# Patient Record
Sex: FEMALE | Race: WHITE | NOT HISPANIC OR LATINO | Employment: UNEMPLOYED | URBAN - METROPOLITAN AREA
[De-identification: names, ages, dates, MRNs, and addresses within clinical notes are randomized per-mention and may not be internally consistent; named-entity substitution may affect disease eponyms.]

---

## 2023-07-17 ENCOUNTER — LAB REQUISITION (OUTPATIENT)
Dept: LAB | Facility: CLINIC | Age: 23
End: 2023-07-17
Payer: COMMERCIAL

## 2023-07-17 DIAGNOSIS — Z13.1 ENCOUNTER FOR SCREENING FOR DIABETES MELLITUS: ICD-10-CM

## 2023-07-17 DIAGNOSIS — Z13.21 ENCOUNTER FOR SCREENING FOR NUTRITIONAL DISORDER: ICD-10-CM

## 2023-07-17 DIAGNOSIS — Z13.29 ENCOUNTER FOR SCREENING FOR OTHER SUSPECTED ENDOCRINE DISORDER: ICD-10-CM

## 2023-07-17 DIAGNOSIS — Z13.220 ENCOUNTER FOR SCREENING FOR LIPOID DISORDERS: ICD-10-CM

## 2023-07-17 LAB
ALBUMIN SERPL BCG-MCNC: 4.9 G/DL (ref 3.5–5.2)
ALP SERPL-CCNC: 74 U/L (ref 35–104)
ALT SERPL W P-5'-P-CCNC: 11 U/L (ref 0–50)
ANION GAP SERPL CALCULATED.3IONS-SCNC: 14 MMOL/L (ref 7–15)
AST SERPL W P-5'-P-CCNC: 16 U/L (ref 0–45)
BILIRUB SERPL-MCNC: 0.2 MG/DL
BUN SERPL-MCNC: 10.4 MG/DL (ref 6–20)
CALCIUM SERPL-MCNC: 9.4 MG/DL (ref 8.6–10)
CHLORIDE SERPL-SCNC: 102 MMOL/L (ref 98–107)
CHOLEST SERPL-MCNC: 196 MG/DL
CREAT SERPL-MCNC: 0.46 MG/DL (ref 0.51–0.95)
DEPRECATED HCO3 PLAS-SCNC: 22 MMOL/L (ref 22–29)
GFR SERPL CREATININE-BSD FRML MDRD: >90 ML/MIN/1.73M2
GLUCOSE SERPL-MCNC: 98 MG/DL (ref 70–99)
HDLC SERPL-MCNC: 68 MG/DL
LDLC SERPL CALC-MCNC: 113 MG/DL
NONHDLC SERPL-MCNC: 128 MG/DL
POTASSIUM SERPL-SCNC: 4.4 MMOL/L (ref 3.4–5.3)
PROT SERPL-MCNC: 7.6 G/DL (ref 6.4–8.3)
SODIUM SERPL-SCNC: 138 MMOL/L (ref 136–145)
TRIGL SERPL-MCNC: 73 MG/DL
TSH SERPL DL<=0.005 MIU/L-ACNC: 1.64 UIU/ML (ref 0.3–4.2)

## 2023-07-17 PROCEDURE — 84443 ASSAY THYROID STIM HORMONE: CPT | Mod: ORL | Performed by: FAMILY MEDICINE

## 2023-07-17 PROCEDURE — 82306 VITAMIN D 25 HYDROXY: CPT | Mod: ORL | Performed by: FAMILY MEDICINE

## 2023-07-17 PROCEDURE — 80053 COMPREHEN METABOLIC PANEL: CPT | Mod: ORL | Performed by: FAMILY MEDICINE

## 2023-07-17 PROCEDURE — 80061 LIPID PANEL: CPT | Mod: ORL | Performed by: FAMILY MEDICINE

## 2023-07-18 LAB — DEPRECATED CALCIDIOL+CALCIFEROL SERPL-MC: 31 UG/L (ref 20–75)

## 2023-07-27 ENCOUNTER — LAB REQUISITION (OUTPATIENT)
Dept: LAB | Facility: CLINIC | Age: 23
End: 2023-07-27
Payer: COMMERCIAL

## 2023-07-27 DIAGNOSIS — Z11.3 ENCOUNTER FOR SCREENING FOR INFECTIONS WITH A PREDOMINANTLY SEXUAL MODE OF TRANSMISSION: ICD-10-CM

## 2023-07-27 DIAGNOSIS — Z12.4 ENCOUNTER FOR SCREENING FOR MALIGNANT NEOPLASM OF CERVIX: ICD-10-CM

## 2023-07-27 PROCEDURE — G0145 SCR C/V CYTO,THINLAYER,RESCR: HCPCS | Mod: ORL | Performed by: PHYSICIAN ASSISTANT

## 2023-07-27 PROCEDURE — 87591 N.GONORRHOEAE DNA AMP PROB: CPT | Mod: ORL | Performed by: PHYSICIAN ASSISTANT

## 2023-07-27 PROCEDURE — 87491 CHLMYD TRACH DNA AMP PROBE: CPT | Mod: ORL | Performed by: PHYSICIAN ASSISTANT

## 2023-07-28 LAB
C TRACH DNA SPEC QL PROBE+SIG AMP: NEGATIVE
N GONORRHOEA DNA SPEC QL NAA+PROBE: NEGATIVE

## 2023-08-01 LAB
BKR LAB AP GYN ADEQUACY: NORMAL
BKR LAB AP GYN INTERPRETATION: NORMAL
BKR LAB AP HPV REFLEX: NORMAL
BKR LAB AP LMP: NORMAL
BKR LAB AP PREVIOUS ABNORMAL: NORMAL
PATH REPORT.COMMENTS IMP SPEC: NORMAL
PATH REPORT.COMMENTS IMP SPEC: NORMAL
PATH REPORT.RELEVANT HX SPEC: NORMAL

## 2024-06-17 ENCOUNTER — LAB REQUISITION (OUTPATIENT)
Dept: LAB | Facility: CLINIC | Age: 24
End: 2024-06-17
Payer: COMMERCIAL

## 2024-06-17 DIAGNOSIS — Z3A.08 8 WEEKS GESTATION OF PREGNANCY: ICD-10-CM

## 2024-06-17 PROCEDURE — 87086 URINE CULTURE/COLONY COUNT: CPT | Mod: ORL | Performed by: NURSE PRACTITIONER

## 2024-06-19 LAB — BACTERIA UR CULT: NORMAL

## 2024-07-17 ENCOUNTER — LAB REQUISITION (OUTPATIENT)
Dept: LAB | Facility: CLINIC | Age: 24
End: 2024-07-17
Payer: COMMERCIAL

## 2024-07-17 DIAGNOSIS — Z34.01 ENCOUNTER FOR SUPERVISION OF NORMAL FIRST PREGNANCY, FIRST TRIMESTER: ICD-10-CM

## 2024-07-17 LAB
BASOPHILS # BLD AUTO: 0.1 10E3/UL (ref 0–0.2)
BASOPHILS NFR BLD AUTO: 1 %
EOSINOPHIL # BLD AUTO: 0.1 10E3/UL (ref 0–0.7)
EOSINOPHIL NFR BLD AUTO: 1 %
ERYTHROCYTE [DISTWIDTH] IN BLOOD BY AUTOMATED COUNT: 13.5 % (ref 10–15)
HBA1C MFR BLD: 5.5 %
HCT VFR BLD AUTO: 40.9 % (ref 35–47)
HGB BLD-MCNC: 13.9 G/DL (ref 11.7–15.7)
IMM GRANULOCYTES # BLD: 0.1 10E3/UL
IMM GRANULOCYTES NFR BLD: 1 %
LYMPHOCYTES # BLD AUTO: 2.6 10E3/UL (ref 0.8–5.3)
LYMPHOCYTES NFR BLD AUTO: 28 %
MCH RBC QN AUTO: 28.9 PG (ref 26.5–33)
MCHC RBC AUTO-ENTMCNC: 34 G/DL (ref 31.5–36.5)
MCV RBC AUTO: 85 FL (ref 78–100)
MONOCYTES # BLD AUTO: 0.5 10E3/UL (ref 0–1.3)
MONOCYTES NFR BLD AUTO: 5 %
NEUTROPHILS # BLD AUTO: 6 10E3/UL (ref 1.6–8.3)
NEUTROPHILS NFR BLD AUTO: 64 %
NRBC # BLD AUTO: 0 10E3/UL
NRBC BLD AUTO-RTO: 0 /100
PLATELET # BLD AUTO: 265 10E3/UL (ref 150–450)
RBC # BLD AUTO: 4.81 10E6/UL (ref 3.8–5.2)
WBC # BLD AUTO: 9.3 10E3/UL (ref 4–11)

## 2024-07-17 PROCEDURE — 83036 HEMOGLOBIN GLYCOSYLATED A1C: CPT | Mod: ORL | Performed by: OBSTETRICS & GYNECOLOGY

## 2024-07-17 PROCEDURE — 86803 HEPATITIS C AB TEST: CPT | Mod: ORL | Performed by: OBSTETRICS & GYNECOLOGY

## 2024-07-17 PROCEDURE — 80081 OBSTETRIC PANEL INC HIV TSTG: CPT | Mod: ORL | Performed by: OBSTETRICS & GYNECOLOGY

## 2024-07-17 PROCEDURE — 87491 CHLMYD TRACH DNA AMP PROBE: CPT | Mod: ORL | Performed by: OBSTETRICS & GYNECOLOGY

## 2024-07-18 LAB
ABO/RH(D): NORMAL
ANTIBODY SCREEN: NEGATIVE
C TRACH DNA SPEC QL PROBE+SIG AMP: NEGATIVE
HBV SURFACE AG SERPL QL IA: NONREACTIVE
HCV AB SERPL QL IA: NONREACTIVE
HIV 1+2 AB+HIV1 P24 AG SERPL QL IA: NONREACTIVE
N GONORRHOEA DNA SPEC QL NAA+PROBE: NEGATIVE
RPR SER QL: NONREACTIVE
RUBV IGG SERPL QL IA: 2.69 INDEX
RUBV IGG SERPL QL IA: POSITIVE
SPECIMEN EXPIRATION DATE: NORMAL

## 2024-08-06 ENCOUNTER — LAB REQUISITION (OUTPATIENT)
Dept: LAB | Facility: CLINIC | Age: 24
End: 2024-08-06
Payer: COMMERCIAL

## 2024-08-06 DIAGNOSIS — Z3A.15 15 WEEKS GESTATION OF PREGNANCY: ICD-10-CM

## 2024-08-06 PROCEDURE — 82105 ALPHA-FETOPROTEIN SERUM: CPT | Mod: ORL | Performed by: NURSE PRACTITIONER

## 2024-08-10 LAB
# FETUSES US: NORMAL
AFP MOM SERPL: 1.43
AFP SERPL-MCNC: 56 NG/ML
AGE - REPORTED: 26.7 YR
CURRENT SMOKER: NO
FAMILY MEMBER DISEASES HX: NO
GA METHOD: NORMAL
GA: NORMAL WK
IDDM PATIENT QL: NO
INTEGRATED SCN PATIENT-IMP: NORMAL
SPECIMEN DRAWN SERPL: NORMAL

## 2024-10-23 ENCOUNTER — TRANSFERRED RECORDS (OUTPATIENT)
Dept: HEALTH INFORMATION MANAGEMENT | Facility: CLINIC | Age: 24
End: 2024-10-23

## 2024-10-23 ENCOUNTER — HOSPITAL ENCOUNTER (OUTPATIENT)
Facility: CLINIC | Age: 24
Discharge: HOME | End: 2024-10-23
Attending: OBSTETRICS & GYNECOLOGY | Admitting: OBSTETRICS & GYNECOLOGY
Payer: COMMERCIAL

## 2024-10-23 VITALS — DIASTOLIC BLOOD PRESSURE: 77 MMHG | SYSTOLIC BLOOD PRESSURE: 125 MMHG | TEMPERATURE: 98.1 F | RESPIRATION RATE: 16 BRPM

## 2024-10-23 PROBLEM — Z36.89 ENCOUNTER FOR TRIAGE IN PREGNANT PATIENT: Status: ACTIVE | Noted: 2024-10-23

## 2024-10-23 LAB
ALBUMIN UR-MCNC: NEGATIVE MG/DL
APPEARANCE UR: CLEAR
BACTERIA #/AREA URNS HPF: ABNORMAL /HPF
BILIRUB UR QL STRIP: NEGATIVE
CLUE CELLS: ABNORMAL
COLOR UR AUTO: ABNORMAL
GLUCOSE UR STRIP-MCNC: NEGATIVE MG/DL
HGB UR QL STRIP: NEGATIVE
KETONES UR STRIP-MCNC: NEGATIVE MG/DL
LEUKOCYTE ESTERASE UR QL STRIP: NEGATIVE
MUCOUS THREADS #/AREA URNS LPF: PRESENT /LPF
NITRATE UR QL: NEGATIVE
PH UR STRIP: 6.5 [PH] (ref 5–7)
RBC URINE: <1 /HPF
SP GR UR STRIP: 1.01 (ref 1–1.03)
SQUAMOUS EPITHELIAL: 9 /HPF
TRICHOMONAS, WET PREP: ABNORMAL
UROBILINOGEN UR STRIP-MCNC: NORMAL MG/DL
WBC URINE: 3 /HPF
WBC'S/HIGH POWER FIELD, WET PREP: ABNORMAL
YEAST, WET PREP: ABNORMAL

## 2024-10-23 PROCEDURE — G0463 HOSPITAL OUTPT CLINIC VISIT: HCPCS

## 2024-10-23 PROCEDURE — 87210 SMEAR WET MOUNT SALINE/INK: CPT

## 2024-10-23 PROCEDURE — 81001 URINALYSIS AUTO W/SCOPE: CPT

## 2024-10-23 PROCEDURE — 87491 CHLMYD TRACH DNA AMP PROBE: CPT

## 2024-10-23 PROCEDURE — 99214 OFFICE O/P EST MOD 30 MIN: CPT | Mod: 25 | Performed by: OBSTETRICS & GYNECOLOGY

## 2024-10-23 PROCEDURE — 87653 STREP B DNA AMP PROBE: CPT

## 2024-10-23 PROCEDURE — 59025 FETAL NON-STRESS TEST: CPT | Mod: 26 | Performed by: OBSTETRICS & GYNECOLOGY

## 2024-10-23 RX ORDER — ACETAMINOPHEN 325 MG/1
650 TABLET ORAL ONCE
Status: DISCONTINUED | OUTPATIENT
Start: 2024-10-23 | End: 2024-10-23 | Stop reason: HOSPADM

## 2024-10-23 RX ORDER — LIDOCAINE 40 MG/G
CREAM TOPICAL
Status: DISCONTINUED | OUTPATIENT
Start: 2024-10-23 | End: 2024-10-23 | Stop reason: HOSPADM

## 2024-10-23 RX ORDER — VITAMIN A, VITAMIN C, VITAMIN D-3, VITAMIN E, VITAMIN B-1, VITAMIN B-2, NIACIN, VITAMIN B-6, CALCIUM, IRON, ZINC, COPPER 4000; 120; 400; 22; 1.84; 3; 20; 10; 1; 12; 200; 27; 25; 2 [IU]/1; MG/1; [IU]/1; MG/1; MG/1; MG/1; MG/1; MG/1; MG/1; UG/1; MG/1; MG/1; MG/1; MG/1
1 TABLET ORAL DAILY
COMMUNITY

## 2024-10-23 ASSESSMENT — ACTIVITIES OF DAILY LIVING (ADL)
ADLS_ACUITY_SCORE: 0

## 2024-10-23 NOTE — PROGRESS NOTES
Data: Patient presented to [facility]: 10/23/2024  6:12 PM.  Reason for maternal/fetal assessment is abdominal pain. Patient reports intermittent abdominal cramping. Patient denies leaking of vaginal fluid/rupture of membranes, vaginal bleeding, pelvic pressure, nausea, vomiting, headache, visual disturbances, epigastric or RUQ pain, significant edema. Patient reports fetal movement is normal. Patient is a 27 weeks .  Prenatal record reviewed. Pregnancy has been uncomplicated.    Vital signs wnl. Support person is present.     Action: Verbal consent for EFM. Triage assessment completed.     Response: Patient verbalized agreement with plan. Will contact Dr. Pereyra with update and further orders.

## 2024-10-24 LAB
C TRACH DNA SPEC QL PROBE+SIG AMP: NEGATIVE
GP B STREP DNA SPEC QL NAA+PROBE: NEGATIVE
N GONORRHOEA DNA SPEC QL NAA+PROBE: NEGATIVE

## 2024-10-24 NOTE — PROGRESS NOTES
Data: Patient assessed in the [facility] for abdominal pain. Cervix  C/L/H Fetal station  . Membranes intact. Contractions are present. Contactions are  , x2 minutes apart, and last  seconds. Uterine assessment is mild by palpation during contractions and soft by palpation at rest. See flowsheets for fetal assessment documentation.     Action: Presumed adequate fetal oxygenation documented. Discharge instructions reviewed. Patient instructed to report change in fetal movement, vaginal leaking of fluid or bleeding, abdominal pain, or any concerns related to the pregnancy to provider/clinic.  Round ligament pain handout given to patient.     Response: Orders to discharge home per Dr. Ferguson. Patient verbalized understanding of education and agreement with plan. Discharged to home at 2153.

## 2024-10-24 NOTE — PROGRESS NOTES
[facility]  OB Triage Note    CC: Abdominal pain    HPI: Dana Lund is a 24 year old  at 27w0d , who presents with abdominal pain. Today she was doing dishes in her usual state of health when suddenly had crampy lower abdominal pain that radiates to her left upper thigh. She localizes pain to bilateral round ligament area. The pain continues to wax and wane. Pain is 6/10 at worst and increases while standing, moving around; improves with rest.    She has not had nausea/vomiting, diarrhea, or constipation. Denies headaches and vision changes. No upper abdominal or RUQ pain. No recent fevers/chills. No new or increased swelling. No dysuria.    She denies contractions, leaking fluid, vaginal bleeding. Good fetal movement. Pregnancy has been uncomplicated.    Obstetric Complications  None, regular prenatal care at [facility]?  Hx of spine surgery 2/2 scoliosis, scheduled for primary CS w/ GETA on 2025    O:  Patient Vitals for the past 24 hrs:   BP Temp Temp src Resp   10/23/24 1825 125/77 98.1  F (36.7  C) Oral 16     Gen: Well-appearing, NAD  CV: Warm well perfused   Pulm:  No increased work of breathing   Abd: Soft, gravid, non-tender to palpation; no palpable contractions   Ext: No LE edema b/l    Spec: Normal vaginal mucosa, physiologic discharge; long cervix, difficulty visualizing os   Cervix: C/L/H     FHT: , moderate fabiana, accels present, no decels   Vowinckel: no ctx in 10 mins    Labs:    Latest Reference Range & Units 10/23/24 20:35   Color Urine Colorless, Straw, Light Yellow, Yellow  Straw   Appearance Urine Clear  Clear   Glucose Urine Negative mg/dL Negative   Bilirubin Urine Negative  Negative   Ketones Urine Negative mg/dL Negative   Specific Gravity Urine 1.003 - 1.035  1.009   pH Urine 5.0 - 7.0  6.5   Protein Albumin Urine Negative mg/dL Negative   Urobilinogen mg/dL Normal, 2.0 mg/dL Normal   Nitrite Urine Negative  Negative   Blood Urine Negative  Negative   Leukocyte  Esterase Urine Negative  Negative   WBC Urine <=5 /HPF 3   RBC Urine <=2 /HPF <1   Bacteria Urine None Seen /HPF Few !   Squamous Epithelial /HPF Urine <=1 /HPF 9 (H)   Mucus Urine None Seen /LPF Present !   !: Data is abnormal  (H): Data is abnormally high     Latest Reference Range & Units 10/23/24 20:34   WBCs/high power field None  1+ !   Clue cells Absent  Absent   WET PREPARATION  Rpt !       A/P:  Dana Lund is a 24 year old  at 27w0d by patient report here with abdominal pain. Given location and characteristics of pain, most likely round ligament pain. Encouraged heat packs, tylenol as needed, and support with belly band. PTL ruled out w/ SSE and SVE. UA and wet prep wnl; GBS and CT/GC pending at time of discharge. FWB confirmed, NST reactive and reassuring with no activity on tocometer. Appropriate for discharge home.       #FWB: - Category I FHT, good fetal movement    Rose Orr, MS3  [employer] [facility] [employer] [employer]    I, Kaleigh Ferguson MD, was present with the medical student who participated in the service and in the documentation of the note. I have verified the history and personally performed the physical exam and medical decision making. I agree with the assessment and plan of care as documented in the note, and have addended it were appropriate.     Discussed with Dr. Pinon.     Kaleigh Ferguson MD  Obstetrics and Gynecology, PGY-2  10/23/24 9:53 PM    I personally examined and evaluated Dana Lund on 10/23/2024.  I discussed the patient with Dr. Ferguson and agree with the presentation, exam and plan of care documented in this note with edits by me.   Kimmie Pinon MD MPH

## 2024-10-29 ENCOUNTER — LAB REQUISITION (OUTPATIENT)
Dept: LAB | Facility: CLINIC | Age: 24
End: 2024-10-29
Payer: COMMERCIAL

## 2024-10-29 DIAGNOSIS — Z36.89 ENCOUNTER FOR OTHER SPECIFIED ANTENATAL SCREENING: ICD-10-CM

## 2024-10-29 DIAGNOSIS — Z11.3 ENCOUNTER FOR SCREENING FOR INFECTIONS WITH A PREDOMINANTLY SEXUAL MODE OF TRANSMISSION: ICD-10-CM

## 2024-10-29 LAB
ERYTHROCYTE [DISTWIDTH] IN BLOOD BY AUTOMATED COUNT: 13.4 % (ref 10–15)
HCT VFR BLD AUTO: 38.7 % (ref 35–47)
HGB BLD-MCNC: 12.2 G/DL (ref 11.7–15.7)
MCH RBC QN AUTO: 27.5 PG (ref 26.5–33)
MCHC RBC AUTO-ENTMCNC: 31.5 G/DL (ref 31.5–36.5)
MCV RBC AUTO: 87 FL (ref 78–100)
PLATELET # BLD AUTO: 275 10E3/UL (ref 150–450)
RBC # BLD AUTO: 4.44 10E6/UL (ref 3.8–5.2)
WBC # BLD AUTO: 12.2 10E3/UL (ref 4–11)

## 2024-10-29 PROCEDURE — 86780 TREPONEMA PALLIDUM: CPT | Mod: ORL | Performed by: NURSE PRACTITIONER

## 2024-10-29 PROCEDURE — 85027 COMPLETE CBC AUTOMATED: CPT | Mod: ORL | Performed by: NURSE PRACTITIONER

## 2024-10-30 LAB — T PALLIDUM AB SER QL: NONREACTIVE

## 2024-12-17 ENCOUNTER — HOSPITAL ENCOUNTER (OUTPATIENT)
Dept: CARDIOLOGY | Facility: CLINIC | Age: 24
Discharge: HOME | End: 2024-12-17
Attending: OBSTETRICS & GYNECOLOGY
Payer: COMMERCIAL

## 2024-12-17 DIAGNOSIS — R00.2 PALPITATIONS: ICD-10-CM

## 2024-12-17 PROCEDURE — 93225 XTRNL ECG REC<48 HRS REC: CPT

## 2025-01-03 ENCOUNTER — LAB REQUISITION (OUTPATIENT)
Dept: LAB | Facility: CLINIC | Age: 25
End: 2025-01-03
Payer: COMMERCIAL

## 2025-01-03 DIAGNOSIS — Z36.85 ENCOUNTER FOR ANTENATAL SCREENING FOR STREPTOCOCCUS B: ICD-10-CM

## 2025-01-03 PROCEDURE — 87653 STREP B DNA AMP PROBE: CPT | Mod: ORL | Performed by: OBSTETRICS & GYNECOLOGY

## 2025-01-04 LAB — GP B STREP DNA SPEC QL NAA+PROBE: NEGATIVE

## 2025-01-14 ENCOUNTER — ANESTHESIA EVENT (OUTPATIENT)
Dept: OBGYN | Facility: HOSPITAL | Age: 25
End: 2025-01-14
Payer: COMMERCIAL

## 2025-01-15 ENCOUNTER — HOSPITAL ENCOUNTER (INPATIENT)
Facility: HOSPITAL | Age: 25
End: 2025-01-15
Attending: OBSTETRICS & GYNECOLOGY | Admitting: OBSTETRICS & GYNECOLOGY
Payer: COMMERCIAL

## 2025-01-15 ENCOUNTER — ANESTHESIA (OUTPATIENT)
Dept: OBGYN | Facility: HOSPITAL | Age: 25
End: 2025-01-15
Payer: COMMERCIAL

## 2025-01-15 DIAGNOSIS — O33.0 CEPHALOPELVIC DISPROPORTION DUE TO DEFORMITY OF MATERNAL PELVIC BONES: Primary | ICD-10-CM

## 2025-01-15 PROBLEM — Z34.90 PREGNANCY: Status: ACTIVE | Noted: 2025-01-15

## 2025-01-15 LAB
ABO + RH BLD: NORMAL
APTT PPP: 26 SECONDS (ref 22–38)
BLD GP AB SCN SERPL QL: NEGATIVE
GLUCOSE BLDC GLUCOMTR-MCNC: 129 MG/DL (ref 70–99)
HGB BLD-MCNC: 11.5 G/DL (ref 11.7–15.7)
HGB BLD-MCNC: 9.7 G/DL (ref 11.7–15.7)
INR PPP: 0.99 (ref 0.85–1.15)
PLATELET # BLD AUTO: 217 10E3/UL (ref 150–450)
SPECIMEN EXP DATE BLD: NORMAL
T PALLIDUM AB SER QL: NONREACTIVE

## 2025-01-15 PROCEDURE — 250N000011 HC RX IP 250 OP 636: Performed by: NURSE ANESTHETIST, CERTIFIED REGISTERED

## 2025-01-15 PROCEDURE — 85049 AUTOMATED PLATELET COUNT: CPT | Performed by: OBSTETRICS & GYNECOLOGY

## 2025-01-15 PROCEDURE — 250N000011 HC RX IP 250 OP 636: Performed by: PHYSICIAN ASSISTANT

## 2025-01-15 PROCEDURE — 36415 COLL VENOUS BLD VENIPUNCTURE: CPT | Performed by: OBSTETRICS & GYNECOLOGY

## 2025-01-15 PROCEDURE — 272N000001 HC OR GENERAL SUPPLY STERILE: Performed by: OBSTETRICS & GYNECOLOGY

## 2025-01-15 PROCEDURE — 258N000003 HC RX IP 258 OP 636: Performed by: NURSE ANESTHETIST, CERTIFIED REGISTERED

## 2025-01-15 PROCEDURE — 85730 THROMBOPLASTIN TIME PARTIAL: CPT | Performed by: OBSTETRICS & GYNECOLOGY

## 2025-01-15 PROCEDURE — 999N000249 HC STATISTIC C-SECTION ON UNIT: Performed by: OBSTETRICS & GYNECOLOGY

## 2025-01-15 PROCEDURE — 370N000017 HC ANESTHESIA TECHNICAL FEE, PER MIN: Performed by: OBSTETRICS & GYNECOLOGY

## 2025-01-15 PROCEDURE — 120N000001 HC R&B MED SURG/OB

## 2025-01-15 PROCEDURE — 85018 HEMOGLOBIN: CPT | Performed by: OBSTETRICS & GYNECOLOGY

## 2025-01-15 PROCEDURE — 85018 HEMOGLOBIN: CPT | Performed by: PHYSICIAN ASSISTANT

## 2025-01-15 PROCEDURE — 86780 TREPONEMA PALLIDUM: CPT | Performed by: PHYSICIAN ASSISTANT

## 2025-01-15 PROCEDURE — 999N000016 HC STATISTIC ATTENDANCE AT DELIVERY

## 2025-01-15 PROCEDURE — 258N000003 HC RX IP 258 OP 636: Performed by: OBSTETRICS & GYNECOLOGY

## 2025-01-15 PROCEDURE — 360N000076 HC SURGERY LEVEL 3, PER MIN: Performed by: OBSTETRICS & GYNECOLOGY

## 2025-01-15 PROCEDURE — 250N000013 HC RX MED GY IP 250 OP 250 PS 637: Performed by: OBSTETRICS & GYNECOLOGY

## 2025-01-15 PROCEDURE — 999N000249 HC STATISTIC C-SECTION ON UNIT

## 2025-01-15 PROCEDURE — 86850 RBC ANTIBODY SCREEN: CPT | Performed by: PHYSICIAN ASSISTANT

## 2025-01-15 PROCEDURE — 36415 COLL VENOUS BLD VENIPUNCTURE: CPT | Performed by: PHYSICIAN ASSISTANT

## 2025-01-15 PROCEDURE — 250N000025 HC SEVOFLURANE, PER MIN: Performed by: OBSTETRICS & GYNECOLOGY

## 2025-01-15 PROCEDURE — 250N000011 HC RX IP 250 OP 636: Performed by: GENERAL ACUTE CARE HOSPITAL

## 2025-01-15 PROCEDURE — 258N000003 HC RX IP 258 OP 636: Performed by: PHYSICIAN ASSISTANT

## 2025-01-15 PROCEDURE — 86900 BLOOD TYPING SEROLOGIC ABO: CPT | Performed by: PHYSICIAN ASSISTANT

## 2025-01-15 PROCEDURE — 250N000013 HC RX MED GY IP 250 OP 250 PS 637: Performed by: PHYSICIAN ASSISTANT

## 2025-01-15 PROCEDURE — 710N000009 HC RECOVERY PHASE 1, LEVEL 1, PER MIN: Performed by: OBSTETRICS & GYNECOLOGY

## 2025-01-15 PROCEDURE — 85610 PROTHROMBIN TIME: CPT | Performed by: OBSTETRICS & GYNECOLOGY

## 2025-01-15 PROCEDURE — 250N000009 HC RX 250: Performed by: PHYSICIAN ASSISTANT

## 2025-01-15 PROCEDURE — 250N000009 HC RX 250: Performed by: NURSE ANESTHETIST, CERTIFIED REGISTERED

## 2025-01-15 RX ORDER — CITRIC ACID/SODIUM CITRATE 334-500MG
30 SOLUTION, ORAL ORAL
Status: COMPLETED | OUTPATIENT
Start: 2025-01-15 | End: 2025-01-15

## 2025-01-15 RX ORDER — SODIUM PHOSPHATE,MONO-DIBASIC 19G-7G/118
1 ENEMA (ML) RECTAL DAILY PRN
Status: DISCONTINUED | OUTPATIENT
Start: 2025-01-17 | End: 2025-01-18 | Stop reason: HOSPADM

## 2025-01-15 RX ORDER — LOPERAMIDE HYDROCHLORIDE 2 MG/1
2 CAPSULE ORAL
Status: DISCONTINUED | OUTPATIENT
Start: 2025-01-15 | End: 2025-01-18 | Stop reason: HOSPADM

## 2025-01-15 RX ORDER — ONDANSETRON 2 MG/ML
4 INJECTION INTRAMUSCULAR; INTRAVENOUS EVERY 30 MIN PRN
Status: DISCONTINUED | OUTPATIENT
Start: 2025-01-15 | End: 2025-01-15 | Stop reason: HOSPADM

## 2025-01-15 RX ORDER — ONDANSETRON 2 MG/ML
4 INJECTION INTRAMUSCULAR; INTRAVENOUS EVERY 6 HOURS PRN
Status: DISCONTINUED | OUTPATIENT
Start: 2025-01-15 | End: 2025-01-18 | Stop reason: HOSPADM

## 2025-01-15 RX ORDER — SODIUM CHLORIDE, SODIUM LACTATE, POTASSIUM CHLORIDE, CALCIUM CHLORIDE 600; 310; 30; 20 MG/100ML; MG/100ML; MG/100ML; MG/100ML
INJECTION, SOLUTION INTRAVENOUS CONTINUOUS
Status: DISCONTINUED | OUTPATIENT
Start: 2025-01-15 | End: 2025-01-15 | Stop reason: HOSPADM

## 2025-01-15 RX ORDER — LOPERAMIDE HYDROCHLORIDE 2 MG/1
4 CAPSULE ORAL
Status: DISCONTINUED | OUTPATIENT
Start: 2025-01-15 | End: 2025-01-18 | Stop reason: HOSPADM

## 2025-01-15 RX ORDER — DEXAMETHASONE SODIUM PHOSPHATE 4 MG/ML
4 INJECTION, SOLUTION INTRA-ARTICULAR; INTRALESIONAL; INTRAMUSCULAR; INTRAVENOUS; SOFT TISSUE
Status: DISCONTINUED | OUTPATIENT
Start: 2025-01-15 | End: 2025-01-15 | Stop reason: HOSPADM

## 2025-01-15 RX ORDER — DEXAMETHASONE SODIUM PHOSPHATE 10 MG/ML
INJECTION, SOLUTION INTRAMUSCULAR; INTRAVENOUS PRN
Status: DISCONTINUED | OUTPATIENT
Start: 2025-01-15 | End: 2025-01-15

## 2025-01-15 RX ORDER — LIDOCAINE 40 MG/G
CREAM TOPICAL
Status: DISCONTINUED | OUTPATIENT
Start: 2025-01-15 | End: 2025-01-15 | Stop reason: HOSPADM

## 2025-01-15 RX ORDER — KETOROLAC TROMETHAMINE 30 MG/ML
INJECTION, SOLUTION INTRAMUSCULAR; INTRAVENOUS PRN
Status: DISCONTINUED | OUTPATIENT
Start: 2025-01-15 | End: 2025-01-15

## 2025-01-15 RX ORDER — SIMETHICONE 80 MG
80 TABLET,CHEWABLE ORAL 4 TIMES DAILY PRN
Status: DISCONTINUED | OUTPATIENT
Start: 2025-01-15 | End: 2025-01-18 | Stop reason: HOSPADM

## 2025-01-15 RX ORDER — LOPERAMIDE HYDROCHLORIDE 2 MG/1
2 CAPSULE ORAL
Status: DISCONTINUED | OUTPATIENT
Start: 2025-01-15 | End: 2025-01-15 | Stop reason: HOSPADM

## 2025-01-15 RX ORDER — BISACODYL 10 MG
10 SUPPOSITORY, RECTAL RECTAL DAILY PRN
Status: DISCONTINUED | OUTPATIENT
Start: 2025-01-17 | End: 2025-01-18 | Stop reason: HOSPADM

## 2025-01-15 RX ORDER — METOCLOPRAMIDE HYDROCHLORIDE 5 MG/ML
10 INJECTION INTRAMUSCULAR; INTRAVENOUS EVERY 6 HOURS PRN
Status: DISCONTINUED | OUTPATIENT
Start: 2025-01-15 | End: 2025-01-18 | Stop reason: HOSPADM

## 2025-01-15 RX ORDER — HYDROMORPHONE HCL IN WATER/PF 6 MG/30 ML
0.4 PATIENT CONTROLLED ANALGESIA SYRINGE INTRAVENOUS EVERY 5 MIN PRN
Status: DISCONTINUED | OUTPATIENT
Start: 2025-01-15 | End: 2025-01-15 | Stop reason: HOSPADM

## 2025-01-15 RX ORDER — MISOPROSTOL 200 UG/1
800 TABLET ORAL
Status: DISCONTINUED | OUTPATIENT
Start: 2025-01-15 | End: 2025-01-15 | Stop reason: HOSPADM

## 2025-01-15 RX ORDER — ONDANSETRON 4 MG/1
4 TABLET, ORALLY DISINTEGRATING ORAL EVERY 6 HOURS PRN
Status: DISCONTINUED | OUTPATIENT
Start: 2025-01-15 | End: 2025-01-18 | Stop reason: HOSPADM

## 2025-01-15 RX ORDER — ACETAMINOPHEN 325 MG/1
975 TABLET ORAL ONCE
Status: COMPLETED | OUTPATIENT
Start: 2025-01-15 | End: 2025-01-15

## 2025-01-15 RX ORDER — IBUPROFEN 800 MG/1
800 TABLET, FILM COATED ORAL EVERY 6 HOURS
Status: DISCONTINUED | OUTPATIENT
Start: 2025-01-15 | End: 2025-01-18 | Stop reason: HOSPADM

## 2025-01-15 RX ORDER — FENTANYL CITRATE 50 UG/ML
50 INJECTION, SOLUTION INTRAMUSCULAR; INTRAVENOUS EVERY 5 MIN PRN
Status: DISCONTINUED | OUTPATIENT
Start: 2025-01-15 | End: 2025-01-15 | Stop reason: HOSPADM

## 2025-01-15 RX ORDER — FENTANYL CITRATE 50 UG/ML
25 INJECTION, SOLUTION INTRAMUSCULAR; INTRAVENOUS EVERY 5 MIN PRN
Status: DISCONTINUED | OUTPATIENT
Start: 2025-01-15 | End: 2025-01-15 | Stop reason: HOSPADM

## 2025-01-15 RX ORDER — AMOXICILLIN 250 MG
1 CAPSULE ORAL 2 TIMES DAILY
Status: DISCONTINUED | OUTPATIENT
Start: 2025-01-15 | End: 2025-01-18 | Stop reason: HOSPADM

## 2025-01-15 RX ORDER — CEFAZOLIN SODIUM/WATER 2 G/20 ML
2 SYRINGE (ML) INTRAVENOUS SEE ADMIN INSTRUCTIONS
Status: DISCONTINUED | OUTPATIENT
Start: 2025-01-15 | End: 2025-01-15 | Stop reason: HOSPADM

## 2025-01-15 RX ORDER — METHYLERGONOVINE MALEATE 0.2 MG/ML
200 INJECTION INTRAVENOUS
Status: DISCONTINUED | OUTPATIENT
Start: 2025-01-15 | End: 2025-01-18 | Stop reason: HOSPADM

## 2025-01-15 RX ORDER — AMOXICILLIN 250 MG
2 CAPSULE ORAL 2 TIMES DAILY
Status: DISCONTINUED | OUTPATIENT
Start: 2025-01-15 | End: 2025-01-18 | Stop reason: HOSPADM

## 2025-01-15 RX ORDER — PROPOFOL 10 MG/ML
INJECTION, EMULSION INTRAVENOUS PRN
Status: DISCONTINUED | OUTPATIENT
Start: 2025-01-15 | End: 2025-01-15

## 2025-01-15 RX ORDER — NALOXONE HYDROCHLORIDE 0.4 MG/ML
0.2 INJECTION, SOLUTION INTRAMUSCULAR; INTRAVENOUS; SUBCUTANEOUS
Status: DISCONTINUED | OUTPATIENT
Start: 2025-01-15 | End: 2025-01-18 | Stop reason: HOSPADM

## 2025-01-15 RX ORDER — MODIFIED LANOLIN
OINTMENT (GRAM) TOPICAL
Status: DISCONTINUED | OUTPATIENT
Start: 2025-01-15 | End: 2025-01-18 | Stop reason: HOSPADM

## 2025-01-15 RX ORDER — ACETAMINOPHEN 325 MG/1
975 TABLET ORAL EVERY 6 HOURS
Status: DISCONTINUED | OUTPATIENT
Start: 2025-01-15 | End: 2025-01-18 | Stop reason: HOSPADM

## 2025-01-15 RX ORDER — OXYTOCIN/0.9 % SODIUM CHLORIDE 30/500 ML
340 PLASTIC BAG, INJECTION (ML) INTRAVENOUS CONTINUOUS PRN
Status: DISCONTINUED | OUTPATIENT
Start: 2025-01-15 | End: 2025-01-18 | Stop reason: HOSPADM

## 2025-01-15 RX ORDER — DEXTROSE, SODIUM CHLORIDE, SODIUM LACTATE, POTASSIUM CHLORIDE, AND CALCIUM CHLORIDE 5; .6; .31; .03; .02 G/100ML; G/100ML; G/100ML; G/100ML; G/100ML
INJECTION, SOLUTION INTRAVENOUS CONTINUOUS
Status: DISCONTINUED | OUTPATIENT
Start: 2025-01-15 | End: 2025-01-18 | Stop reason: HOSPADM

## 2025-01-15 RX ORDER — OXYTOCIN 10 [USP'U]/ML
10 INJECTION, SOLUTION INTRAMUSCULAR; INTRAVENOUS
Status: DISCONTINUED | OUTPATIENT
Start: 2025-01-15 | End: 2025-01-15 | Stop reason: HOSPADM

## 2025-01-15 RX ORDER — NALOXONE HYDROCHLORIDE 0.4 MG/ML
0.4 INJECTION, SOLUTION INTRAMUSCULAR; INTRAVENOUS; SUBCUTANEOUS
Status: DISCONTINUED | OUTPATIENT
Start: 2025-01-15 | End: 2025-01-18 | Stop reason: HOSPADM

## 2025-01-15 RX ORDER — NALOXONE HYDROCHLORIDE 0.4 MG/ML
0.1 INJECTION, SOLUTION INTRAMUSCULAR; INTRAVENOUS; SUBCUTANEOUS
Status: DISCONTINUED | OUTPATIENT
Start: 2025-01-15 | End: 2025-01-15 | Stop reason: HOSPADM

## 2025-01-15 RX ORDER — OXYCODONE HYDROCHLORIDE 5 MG/1
5 TABLET ORAL EVERY 4 HOURS PRN
Status: DISCONTINUED | OUTPATIENT
Start: 2025-01-15 | End: 2025-01-18 | Stop reason: HOSPADM

## 2025-01-15 RX ORDER — BUPIVACAINE HYDROCHLORIDE 2.5 MG/ML
INJECTION, SOLUTION EPIDURAL; INFILTRATION; INTRACAUDAL
Status: COMPLETED | OUTPATIENT
Start: 2025-01-15 | End: 2025-01-15

## 2025-01-15 RX ORDER — CARBOPROST TROMETHAMINE 250 UG/ML
250 INJECTION, SOLUTION INTRAMUSCULAR
Status: DISCONTINUED | OUTPATIENT
Start: 2025-01-15 | End: 2025-01-15 | Stop reason: HOSPADM

## 2025-01-15 RX ORDER — METHYLERGONOVINE MALEATE 0.2 MG/ML
200 INJECTION INTRAVENOUS
Status: DISCONTINUED | OUTPATIENT
Start: 2025-01-15 | End: 2025-01-15 | Stop reason: HOSPADM

## 2025-01-15 RX ORDER — ONDANSETRON 2 MG/ML
INJECTION INTRAMUSCULAR; INTRAVENOUS PRN
Status: DISCONTINUED | OUTPATIENT
Start: 2025-01-15 | End: 2025-01-15

## 2025-01-15 RX ORDER — TRANEXAMIC ACID 10 MG/ML
1 INJECTION, SOLUTION INTRAVENOUS EVERY 30 MIN PRN
Status: DISCONTINUED | OUTPATIENT
Start: 2025-01-15 | End: 2025-01-15 | Stop reason: HOSPADM

## 2025-01-15 RX ORDER — OXYTOCIN/0.9 % SODIUM CHLORIDE 30/500 ML
340 PLASTIC BAG, INJECTION (ML) INTRAVENOUS CONTINUOUS PRN
Status: DISCONTINUED | OUTPATIENT
Start: 2025-01-15 | End: 2025-01-15 | Stop reason: HOSPADM

## 2025-01-15 RX ORDER — LOPERAMIDE HYDROCHLORIDE 2 MG/1
4 CAPSULE ORAL
Status: DISCONTINUED | OUTPATIENT
Start: 2025-01-15 | End: 2025-01-15 | Stop reason: HOSPADM

## 2025-01-15 RX ORDER — CEFAZOLIN SODIUM/WATER 2 G/20 ML
2 SYRINGE (ML) INTRAVENOUS
Status: COMPLETED | OUTPATIENT
Start: 2025-01-15 | End: 2025-01-15

## 2025-01-15 RX ORDER — METOCLOPRAMIDE 10 MG/1
10 TABLET ORAL EVERY 6 HOURS PRN
Status: DISCONTINUED | OUTPATIENT
Start: 2025-01-15 | End: 2025-01-18 | Stop reason: HOSPADM

## 2025-01-15 RX ORDER — ONDANSETRON 4 MG/1
4 TABLET, ORALLY DISINTEGRATING ORAL EVERY 30 MIN PRN
Status: DISCONTINUED | OUTPATIENT
Start: 2025-01-15 | End: 2025-01-15 | Stop reason: HOSPADM

## 2025-01-15 RX ORDER — OXYTOCIN 10 [USP'U]/ML
10 INJECTION, SOLUTION INTRAMUSCULAR; INTRAVENOUS
Status: DISCONTINUED | OUTPATIENT
Start: 2025-01-15 | End: 2025-01-18 | Stop reason: HOSPADM

## 2025-01-15 RX ORDER — MISOPROSTOL 200 UG/1
400 TABLET ORAL
Status: DISCONTINUED | OUTPATIENT
Start: 2025-01-15 | End: 2025-01-18 | Stop reason: HOSPADM

## 2025-01-15 RX ORDER — MISOPROSTOL 200 UG/1
400 TABLET ORAL
Status: DISCONTINUED | OUTPATIENT
Start: 2025-01-15 | End: 2025-01-15 | Stop reason: HOSPADM

## 2025-01-15 RX ORDER — PROCHLORPERAZINE MALEATE 10 MG
10 TABLET ORAL EVERY 6 HOURS PRN
Status: DISCONTINUED | OUTPATIENT
Start: 2025-01-15 | End: 2025-01-18 | Stop reason: HOSPADM

## 2025-01-15 RX ORDER — TRANEXAMIC ACID 10 MG/ML
1 INJECTION, SOLUTION INTRAVENOUS EVERY 30 MIN PRN
Status: DISCONTINUED | OUTPATIENT
Start: 2025-01-15 | End: 2025-01-18 | Stop reason: HOSPADM

## 2025-01-15 RX ORDER — MISOPROSTOL 200 UG/1
800 TABLET ORAL
Status: DISCONTINUED | OUTPATIENT
Start: 2025-01-15 | End: 2025-01-18 | Stop reason: HOSPADM

## 2025-01-15 RX ORDER — OXYTOCIN/0.9 % SODIUM CHLORIDE 30/500 ML
100-340 PLASTIC BAG, INJECTION (ML) INTRAVENOUS CONTINUOUS PRN
Status: DISCONTINUED | OUTPATIENT
Start: 2025-01-15 | End: 2025-01-18 | Stop reason: HOSPADM

## 2025-01-15 RX ORDER — HYDROMORPHONE HCL IN WATER/PF 6 MG/30 ML
0.2 PATIENT CONTROLLED ANALGESIA SYRINGE INTRAVENOUS EVERY 5 MIN PRN
Status: DISCONTINUED | OUTPATIENT
Start: 2025-01-15 | End: 2025-01-15 | Stop reason: HOSPADM

## 2025-01-15 RX ORDER — HYDROCORTISONE 25 MG/G
CREAM TOPICAL 3 TIMES DAILY PRN
Status: DISCONTINUED | OUTPATIENT
Start: 2025-01-15 | End: 2025-01-18 | Stop reason: HOSPADM

## 2025-01-15 RX ORDER — CARBOPROST TROMETHAMINE 250 UG/ML
250 INJECTION, SOLUTION INTRAMUSCULAR
Status: DISCONTINUED | OUTPATIENT
Start: 2025-01-15 | End: 2025-01-18 | Stop reason: HOSPADM

## 2025-01-15 RX ORDER — IBUPROFEN 800 MG/1
800 TABLET, FILM COATED ORAL EVERY 6 HOURS
Status: DISCONTINUED | OUTPATIENT
Start: 2025-01-16 | End: 2025-01-15

## 2025-01-15 RX ORDER — LIDOCAINE 40 MG/G
CREAM TOPICAL
Status: DISCONTINUED | OUTPATIENT
Start: 2025-01-15 | End: 2025-01-18 | Stop reason: HOSPADM

## 2025-01-15 RX ORDER — SODIUM CHLORIDE, SODIUM LACTATE, POTASSIUM CHLORIDE, CALCIUM CHLORIDE 600; 310; 30; 20 MG/100ML; MG/100ML; MG/100ML; MG/100ML
INJECTION, SOLUTION INTRAVENOUS CONTINUOUS
Status: DISCONTINUED | OUTPATIENT
Start: 2025-01-15 | End: 2025-01-18 | Stop reason: HOSPADM

## 2025-01-15 RX ORDER — KETOROLAC TROMETHAMINE 30 MG/ML
30 INJECTION, SOLUTION INTRAMUSCULAR; INTRAVENOUS EVERY 6 HOURS
Status: DISCONTINUED | OUTPATIENT
Start: 2025-01-15 | End: 2025-01-15

## 2025-01-15 RX ADMIN — SODIUM CHLORIDE, POTASSIUM CHLORIDE, SODIUM LACTATE AND CALCIUM CHLORIDE: 600; 310; 30; 20 INJECTION, SOLUTION INTRAVENOUS at 14:07

## 2025-01-15 RX ADMIN — KETOROLAC TROMETHAMINE 15 MG: 30 INJECTION, SOLUTION INTRAMUSCULAR at 10:09

## 2025-01-15 RX ADMIN — SODIUM CHLORIDE, POTASSIUM CHLORIDE, SODIUM LACTATE AND CALCIUM CHLORIDE 500 ML: 600; 310; 30; 20 INJECTION, SOLUTION INTRAVENOUS at 06:02

## 2025-01-15 RX ADMIN — SODIUM CHLORIDE, POTASSIUM CHLORIDE, SODIUM LACTATE AND CALCIUM CHLORIDE: 600; 310; 30; 20 INJECTION, SOLUTION INTRAVENOUS at 09:23

## 2025-01-15 RX ADMIN — SENNOSIDES AND DOCUSATE SODIUM 1 TABLET: 50; 8.6 TABLET ORAL at 22:18

## 2025-01-15 RX ADMIN — BUPIVACAINE 10 ML: 13.3 INJECTION, SUSPENSION, LIPOSOMAL INFILTRATION at 10:22

## 2025-01-15 RX ADMIN — Medication 2 G: at 09:31

## 2025-01-15 RX ADMIN — SIMETHICONE 80 MG: 80 TABLET, CHEWABLE ORAL at 19:50

## 2025-01-15 RX ADMIN — TRANEXAMIC ACID 1 G: 1 INJECTION, SOLUTION INTRAVENOUS at 09:32

## 2025-01-15 RX ADMIN — SENNOSIDES AND DOCUSATE SODIUM 2 TABLET: 50; 8.6 TABLET ORAL at 12:40

## 2025-01-15 RX ADMIN — ACETAMINOPHEN 975 MG: 325 TABLET ORAL at 06:16

## 2025-01-15 RX ADMIN — SODIUM CITRATE AND CITRIC ACID MONOHYDRATE 30 ML: 500; 334 SOLUTION ORAL at 06:15

## 2025-01-15 RX ADMIN — PROPOFOL 200 MG: 10 INJECTION, EMULSION INTRAVENOUS at 09:41

## 2025-01-15 RX ADMIN — BUPIVACAINE HYDROCHLORIDE 20 ML: 2.5 INJECTION, SOLUTION EPIDURAL; INFILTRATION; INTRACAUDAL at 10:22

## 2025-01-15 RX ADMIN — ACETAMINOPHEN 975 MG: 325 TABLET ORAL at 12:40

## 2025-01-15 RX ADMIN — ONDANSETRON 4 MG: 2 INJECTION INTRAMUSCULAR; INTRAVENOUS at 09:53

## 2025-01-15 RX ADMIN — DEXAMETHASONE SODIUM PHOSPHATE 10 MG: 10 INJECTION, SOLUTION INTRAMUSCULAR; INTRAVENOUS at 09:53

## 2025-01-15 RX ADMIN — METHYLERGONOVINE MALEATE 200 MCG: 0.2 INJECTION INTRAVENOUS at 09:48

## 2025-01-15 RX ADMIN — ACETAMINOPHEN 975 MG: 325 TABLET ORAL at 19:50

## 2025-01-15 RX ADMIN — LIDOCAINE HYDROCHLORIDE 50 MG: 10 INJECTION, SOLUTION EPIDURAL; INFILTRATION; INTRACAUDAL; PERINEURAL at 09:41

## 2025-01-15 RX ADMIN — IBUPROFEN 800 MG: 800 TABLET ORAL at 16:35

## 2025-01-15 RX ADMIN — BUPIVACAINE HYDROCHLORIDE 20 ML: 2.5 INJECTION, SOLUTION EPIDURAL; INFILTRATION; INTRACAUDAL at 10:27

## 2025-01-15 RX ADMIN — Medication 340 ML/HR: at 09:46

## 2025-01-15 RX ADMIN — BUPIVACAINE 10 ML: 13.3 INJECTION, SUSPENSION, LIPOSOMAL INFILTRATION at 10:27

## 2025-01-15 RX ADMIN — FENTANYL CITRATE 25 MCG: 50 INJECTION, SOLUTION INTRAMUSCULAR; INTRAVENOUS at 10:46

## 2025-01-15 RX ADMIN — HYDROMORPHONE HYDROCHLORIDE 1 MG: 1 INJECTION, SOLUTION INTRAMUSCULAR; INTRAVENOUS; SUBCUTANEOUS at 10:03

## 2025-01-15 RX ADMIN — IBUPROFEN 800 MG: 800 TABLET ORAL at 22:18

## 2025-01-15 RX ADMIN — Medication 80 MG: at 09:46

## 2025-01-15 ASSESSMENT — ACTIVITIES OF DAILY LIVING (ADL)
ADLS_ACUITY_SCORE: 28
ADLS_ACUITY_SCORE: 34
ADLS_ACUITY_SCORE: 28
ADLS_ACUITY_SCORE: 18
ADLS_ACUITY_SCORE: 28
ADLS_ACUITY_SCORE: 21
ADLS_ACUITY_SCORE: 28
ADLS_ACUITY_SCORE: 34
ADLS_ACUITY_SCORE: 28
ADLS_ACUITY_SCORE: 27
ADLS_ACUITY_SCORE: 18
ADLS_ACUITY_SCORE: 18
ADLS_ACUITY_SCORE: 28
ADLS_ACUITY_SCORE: 21
ADLS_ACUITY_SCORE: 18
ADLS_ACUITY_SCORE: 18
ADLS_ACUITY_SCORE: 21
ADLS_ACUITY_SCORE: 27

## 2025-01-15 NOTE — PLAN OF CARE
Notified of QBL 1225 mL.  Per RN fundus is firm, pulse is normal, appropriate UOP thus far postop.  Stage 2 PPH order set initiated.    Ada Bailey MD FACOG  [facility]  [phone]

## 2025-01-15 NOTE — PLAN OF CARE
Goal Outcome Evaluation:      Plan of Care Reviewed With: patient    Overall Patient Progress: improvingOverall Patient Progress: improving       Problem: Postpartum ( Delivery)  Goal: Optimal Pain Control and Function  Outcome: Progressing  Intervention: Prevent or Manage Pain  Recent Flowsheet Documentation  Taken 1/15/2025 9665 by Dayana Feliz RN  Pain Management Interventions: medication (see MAR)  Pt is taking scheduled pain medications in order to stay on top of pain management. Pt is rating pain a 1 on the 0-10 pain scale.

## 2025-01-15 NOTE — OP NOTE
[facility] LABOR & DELIVERY    SECTION - OPERATIVE NOTE      NAME:Dana Lund  : 2000   MRN: [id number]   GESTATIONAL AGE: 39w0d    ADMISSION DATE: 1/15/2025     PCP:  Davi Lua     DATE OF SERVICE: 1/15/2025     PREOPERATIVE DIAGNOSIS:   24 year old y.o.  at 39w0d  Severe scoliosis  Scheduled primary     POSTOPERATIVE DIAGNOSIS:  Same      PROCEDURE: Low transverse  section      SURGEON: Aida Sarmiento DO    ASSISTANT:     ANESTHESIA: general    Delivery QBL (mL): 1092    DRAINS: Aggarwal catheter.    COMPLICATIONS: none    FINDINGS: Normal uterus, tubes and ovaries bilaterally.  Live male infant born with Apgars assigned per NICU team, weight unavailable at time of dictation.    PROCEDURE:  Patient was met preoperatively where we discussed the procedure and the risks associated with the procedure.  She understood these to include but not limited to injury to adjacent organs including bowel, bladder, ureter, infection and bleeding. Questions were answered.  Consents were signed.      She was brought to the operating room in stable condition.   Fetal heart tones were checked and were stable. She was carefully prepped and draped in sterile fashion for the procedure.  A timeout was then performed.      After induction of a general anesthetic, a Pfannenstiel skin incision was made with the scalpel and carried down to the underlying layer of fascia.  The fascia was was incised in the midline extended laterally with the Brower scissors.  The superior and inferior aspects of the rectus fascia were elevated up and the underlying rectus muscles dissected off with sharp and blunt techniques.  The rectus muscles were then  at the midline and the peritoneum was identified and entered.  This incision was then extended with good visualization of the bladder.  A bladder blade was introduced.  A low transverse uterine incision was made revealing clear amniotic  fluid.  The infant's head was then delivered atraumatically.  The shoulders and body were delivered without difficulty.  The cord was clamped x 2 and cut and the infant handed off to waiting nursing personnel.    The placenta was removed manually from the uterus.  There was initial atony noted. The uterus was exteriorized, covered with a moist laparotomy sponge and cleared of all clots and debris.  The uterine incision was now closed with 0-vicryl in a running locked fashion.  Excellent hemostasis was obtained with a second layer. The uterus was returned cavity.  The pericolic gutters were cleared of all clots and debris.  The uterine incision was again inspected and noted to be hemostatic.  The peritoneum was replaced.  The rectus muscles were brought together with 0 Vicryl.  The fascia was closed with 0-vicryl and the skin was closed with 3-0 Stratifex.  Patient tolerated the procedure well.  Sponge, lap and needle counts were correct x two.    SPECIMENS SENT: cord segment    Aida Sarmiento DO      CC: Aida Sarmiento DO

## 2025-01-15 NOTE — PROGRESS NOTES
Data: Dana Lund transferred to [facility]/SEHZ25-4 via wheelchair. Patient transferred to Postpartum with .    Action: Receiving unit notified of transfer. Patient and support person notified of room change. Patient and belongings accompanied by Registered Nurse. Bedside report given to Dayana Feliz RN. Fundal check performed with receiving RN. Oriented patient to surroundings. Call light within reach.    Response: Patient tolerated transfer.    Valeri Doyle RN  1/15/2025 2:30 PM

## 2025-01-15 NOTE — ANESTHESIA PROCEDURE NOTES
Airway       Patient location during procedure: OR       Procedure Start/Stop Times: 1/15/2025 9:42 AM  Staff -        CRNA: Jean Coronado APRN CRNA       Performed By: CRNAIndications and Patient Condition       Indications for airway management: daniel-procedural       Induction type:intravenous       Mask difficulty assessment: 0 - not attempted    Final Airway Details       Final airway type: endotracheal airway       Successful airway: ETT - single  Endotracheal Airway Details        ETT size (mm): 7.0       Cuffed: yes       Successful intubation technique: video laryngoscopy       VL Blade Size: MAC 3       Grade View of Cords: 1       Adjucts: stylet       Position: Center       Measured from: gums/teeth       Secured at (cm): 21       Bite block used: Oral Airway    Post intubation assessment        Placement verified by: capnometry, equal breath sounds and chest rise        Number of attempts at approach: 1       Number of other approaches attempted: 0       Ease of procedure: easy       Dentition: Intact and Unchanged    Medication(s) Administered   Medication Administration Time: 1/15/2025 9:42 AM

## 2025-01-15 NOTE — PROGRESS NOTES
1533: Updated Dr Bailey pt's IV infiltrated. Noel to stop the continuous IV fluids and will switch the Toradol to Ibuprofen. Pt denying pain and has adequate urine output. Hemoglobin 9.7.    1731: Dr Mark cook for hyde catheter to be discontinued at this time.

## 2025-01-15 NOTE — PROGRESS NOTES
1500: D:  Patient admitted to [facility]/VQFF21-0 via wheelchair with  and support person.  A:  Bedside report received from MARK, RN. Oriented patient and family to surroundings; call light within reach. 4 Part ID bands double checked with reporting RN.  R:  Patient and  tolerated transfer. Care assumed.

## 2025-01-15 NOTE — PROGRESS NOTES
Respiratory Care Note    Delivery team called to OR delivery, no interventions were needed.     Carlene Dixon, RT

## 2025-01-15 NOTE — ANESTHESIA PREPROCEDURE EVALUATION
"Anesthesia Pre-Procedure Evaluation    Patient: Dana Lund   MRN: [id number] : 2000        Procedure : Procedure(s):  PRIMARY  SECTION          No past medical history on file.   Past Surgical History:   Procedure Laterality Date    BACK SURGERY  2013      No Known Allergies   Social History     Tobacco Use    Smoking status: Never    Smokeless tobacco: Never   Substance Use Topics    Alcohol use: Never      Wt Readings from Last 1 Encounters:   No data found for Wt        Anesthesia Evaluation        History of anesthetic complications  - PONV.      ROS/MED HX  ENT/Pulmonary:  - neg pulmonary ROS  (-) asthma   Neurologic: Comment: H/o severe scoliosis requiring corrective surgery as child      Cardiovascular:       METS/Exercise Tolerance:     Hematologic:  - neg hematologic  ROS     Musculoskeletal:   (+)       scoliosis,  lumbar spine surgery,      GI/Hepatic:  - neg GI/hepatic ROS     Renal/Genitourinary:       Endo:  - neg endo ROS     Psychiatric/Substance Use:  - neg psychiatric ROS     Infectious Disease:       Malignancy:       Other:            Physical Exam    Airway        Mallampati: II   TM distance: > 3 FB   Neck ROM: full   Mouth opening: > 3 cm    Respiratory Devices and Support         Dental  no notable dental history         Cardiovascular   cardiovascular exam normal          Pulmonary   pulmonary exam normal                OUTSIDE LABS:  CBC:   Lab Results   Component Value Date    WBC 12.2 (H) 10/29/2024    WBC 9.3 2024    HGB 12.2 10/29/2024    HGB 13.9 2024    HCT 38.7 10/29/2024    HCT 40.9 2024     10/29/2024     2024     BMP:   Lab Results   Component Value Date     2023    POTASSIUM 4.4 2023    CHLORIDE 102 2023    CO2 22 2023    BUN 10.4 2023    CR 0.46 (L) 2023    GLC 98 2023     COAGS: No results found for: \"PTT\", \"INR\", \"FIBR\"  POC: No results found for: \"BGM\", \"HCG\", " "\"HCGS\"  HEPATIC:   Lab Results   Component Value Date    ALBUMIN 4.9 07/17/2023    PROTTOTAL 7.6 07/17/2023    ALT 11 07/17/2023    AST 16 07/17/2023    ALKPHOS 74 07/17/2023    BILITOTAL 0.2 07/17/2023     OTHER:   Lab Results   Component Value Date    A1C 5.5 07/17/2024    KILEY 9.4 07/17/2023    TSH 1.64 07/17/2023       Anesthesia Plan    ASA Status:  2       Anesthesia Type: General.              Consents    Anesthesia Plan(s) and associated risks, benefits, and realistic alternatives discussed. Questions answered and patient/representative(s) expressed understanding.     - Discussed:     - Discussed with:  Patient, Spouse            Postoperative Care            Comments:    Other Comments: +B/l TAP blocks       neg OB ROS.      Jerel Daugherty MD    I have reviewed the pertinent notes and labs in the chart from the past 30 days and (re)examined the patient.  Any updates or changes from those notes are reflected in this note.                                   "

## 2025-01-15 NOTE — PLAN OF CARE
Goal Outcome Evaluation:      Plan of Care Reviewed With: patient    Overall Patient Progress: improvingOverall Patient Progress: improving    Outcome Evaluation: Patient VSS and afebrile. Patient notes pain is a 3 out of 10 and denies wanting medication. QBL at delivery is 1092. Bandage over uterine incision is clean, dry, and intact. Fundal rubs are firm, midline, and one below umbilicus. Patient has attempted to breast this morning. Patient's hyde is still in place and is draining. Patient status improving.

## 2025-01-15 NOTE — ANESTHESIA POSTPROCEDURE EVALUATION
Patient: Dana Lund    Procedure: Procedure(s):  PRIMARY  SECTION       Anesthesia Type:  General    Note:  Disposition: Outpatient   Postop Pain Control: Uneventful            Sign Out: Well controlled pain   PONV: No   Neuro/Psych: Uneventful            Sign Out: Acceptable/Baseline neuro status   Airway/Respiratory: Uneventful            Sign Out: Acceptable/Baseline resp. status   CV/Hemodynamics: Uneventful            Sign Out: Acceptable CV status; No obvious hypovolemia; No obvious fluid overload   Other NRE: NONE   DID A NON-ROUTINE EVENT OCCUR? No           Last vitals:  Vitals Value Taken Time   /74 01/15/25 1121   Temp 36.6  C (97.8  F) 01/15/25 1046   Pulse 92 01/15/25 1118   Resp 21 01/15/25 1117   SpO2 100 % 01/15/25 1118   Vitals shown include unfiled device data.    Electronically Signed By: Jerel Daugherty MD  January 15, 2025  2:45 PM  
No

## 2025-01-15 NOTE — PROGRESS NOTES
RN pages IHOB and notified provider of QBL with PP pads of 1225. Provider will put in hemorrhage orders.

## 2025-01-15 NOTE — DISCHARGE INSTRUCTIONS
Warning Signs after Having a Baby    Keep this paper on your fridge or somewhere else where you can see it.    Call your provider if you have any of these symptoms up to 12 weeks after having your baby.    Thoughts of hurting yourself or your baby  Pain in your chest or trouble breathing  Severe headache not helped by pain medicine  Eyesight concerns (blurry vision, seeing spots or flashes of light, other changes to eyesight)  Fainting, shaking or other signs of a seizure    Call 9-1-1 if you feel that it is an emergency.     The symptoms below can happen to anyone after giving birth. They can be very serious. Call your provider if you have any of these warning signs.    My provider s phone number: _______________________    Losing too much blood (hemorrhage)    Call your provider if you soak through a pad in less than an hour or pass blood clots bigger than a golf ball. These may be signs that you are bleeding too much.    Blood clots in the legs or lungs    After you give birth, your body naturally clots its blood to help prevent blood loss. Sometimes this increased clotting can happen in other areas of the body, like the legs or lungs. This can block your blood flow and be very dangerous.     Call your provider if you:  Have a red, swollen spot on the back of your leg that is warm or painful when you touch it.   Are coughing up blood.     Infection    Call your provider if you have any of these symptoms:  Fever of 100.4 F (38 C) or higher.  Pain or redness around your stitches if you had an incision.   Any yellow, white, or green fluid coming from places where you had stitches or surgery.    Mood Problems (postpartum depression)    Many people feel sad or have mood changes after having a baby. But for some people, these mood swings are worse.     Call your provider right away if you feel so anxious or nervous that you can't care for yourself or your baby.    Preeclampsia (high blood pressure)    Even if you  didn't have high blood pressure when you were pregnant, you are at risk for the high blood pressure disease called preeclampsia. This risk can last up to 12 weeks after giving birth.     Call your provider if you have:   Pain on your right side under your rib cage  Sudden swelling in the hands and face    Remember: You know your body. If something doesn't feel right, get medical help.     For informational purposes only. Not to replace the advice of your health care provider. Copyright 2020 [facility]. All rights reserved. Clinically reviewed by Tiffany Cervantes, RNC-OB, MSN. [employer] [id number] - Rev 02/23.

## 2025-01-15 NOTE — H&P
[facility] LABOR & DELIVERY   HISTORY AND PHYSICAL UPDATE ADMISSION EXAM      NAME:Dana Lund  : 2000   MRN: [id number]   GESTATIONAL AGE: 39w0d    ADMISSION DATE: 1/15/2025     PCP:  Davi Lua       Pregnancy History: This is a 25yo  @ 39w0d here for a scheduled primary CS under general anesthesia secondary to severe scoliosis.      OBSTETRIC HISTORY:    OB History    Para Term  AB Living   1 0 0 0 0 0   SAB IAB Ectopic Multiple Live Births   0 0 0 0 0      # Outcome Date GA Lbr Seth/2nd Weight Sex Type Anes PTL Lv   1 Current                EDC: Estimated Date of Delivery: 2025    EGA: 39w0d      Prenatal Complications   Patient Active Problem List   Diagnosis    Encounter for triage in pregnant patient       Exam:      /62 (BP Location: Left arm, Patient Position: Right side, Cuff Size: Adult Regular)   Temp 98  F (36.7  C) (Oral)   Resp 15   Ht 1.524 m (5')   Wt 72.6 kg (160 lb)   SpO2 99%   Breastfeeding No   BMI 31.25 kg/m      Fetal heart Rate Tracing: reactive and reassuring  Contractions: acontractile    HEENT  negative  Heart       Lungs      Abdomen   Abdomen soft, non-tender. BS normal. No masses, organomegaly  Extremities  Normal, Warm, No cyanosis, no clubbing, No edema, and nontender    Vaginal exam: deferred    Assessment: 25yo  @39wks for primary scheduled CS     Plan: Admit - see IP orders  Prepare for  section  Risks and benefits were discussed with patient, including but not limited to bleeding, infection, injury to other organs.  Questions answered.  Consent obtained.      Prenatal record reviewed.    Aida Sarmiento DO on 1/15/2025 at 8:07 AM

## 2025-01-15 NOTE — ANESTHESIA PROCEDURE NOTES
"TAP Procedure Note    Pre-Procedure   Staff -        Anesthesiologist:  Jerel Daugherty MD       Performed By: anesthesiologist       Location: OB       Procedure Start/Stop Times: 1/15/2025 10:22 AM and 1/15/2025 10:28 AM       Pre-Anesthestic Checklist: patient identified, IV checked, site marked, risks and benefits discussed, informed consent, monitors and equipment checked, pre-op evaluation, at physician/surgeon's request and post-op pain management  Timeout:       Correct Patient: Yes        Correct Procedure: Yes        Correct Site: Yes        Correct Position: Yes        Correct Laterality: Yes        Site Marked: Yes  Procedure Documentation  Procedure: TAP         Laterality: bilateral       Patient Position: supine       Skin prep: Chloraprep       Needle Gauge: 20.        Needle Length (Inches): 4        Ultrasound guided       1. Ultrasound was used to identify targeted nerve, plexus, vascular marker, or fascial plane and place a needle adjacent to it in real-time.       2. Ultrasound was used to visualize the spread of anesthetic in close proximity to the above referenced structure.    Assessment/Narrative         Bolus given via needle..        Secured via.        Insertion/Infusion Method: Single Shot    Medication(s) Administered   Bupivacaine 0.25% PF (Infiltration) - Infiltration   20 mL - 1/15/2025 10:22:00 AM   20 mL - 1/15/2025 10:27:00 AM  Bupivacaine liposome (Exparel) 1.3% LA inj susp (Infiltration) - Infiltration   10 mL - 1/15/2025 10:22:00 AM   10 mL - 1/15/2025 10:27:00 AM  Medication Administration Time: 1/15/2025 10:22 AM      FOR [facility] ([facility]) ONLY:   Pain Team Contact information: please page the Pain Team Via [employer]. Search \"Pain\". During daytime hours, please page the attending first. At night please page the resident first.      "

## 2025-01-15 NOTE — ANESTHESIA CARE TRANSFER NOTE
Patient: Dana Lund    Procedure: Procedure(s):  PRIMARY  SECTION       Diagnosis: Scoliosis deformity of spine [M41.9]  Diagnosis Additional Information: No value filed.    Anesthesia Type:   General     Note:    Oropharynx: oropharynx clear of all foreign objects and spontaneously breathing  Level of Consciousness: awake  Oxygen Supplementation: room air    Independent Airway: airway patency satisfactory and stable  Dentition: dentition unchanged  Vital Signs Stable: post-procedure vital signs reviewed and stable  Report to RN Given: handoff report given  Patient transferred to: PACU    Handoff Report: Identifed the Patient, Identified the Reponsible Provider, Reviewed the pertinent medical history, Discussed the surgical course, Reviewed Intra-OP anesthesia mangement and issues during anesthesia, Set expectations for post-procedure period and Allowed opportunity for questions and acknowledgement of understanding      Vitals:  Vitals Value Taken Time   /79 01/15/25 1045   Temp 36    Pulse 89 01/15/25 1048   Resp 18 01/15/25 1048   SpO2 97 % 01/15/25 1048   Vitals shown include unfiled device data.    Electronically Signed By: MARIS Davis CRNA  January 15, 2025  10:49 AM

## 2025-01-15 NOTE — PLAN OF CARE
Goal Outcome Evaluation:      Plan of Care Reviewed With: patient    Overall Patient Progress: no changeOverall Patient Progress: no change    Outcome Evaluation: Patient is a  at 39 weeks for a scheduled  section. Patient VSS and afebrile. Patient denies any pain. NST Cat. 1 with 15x15 accelerations and moderate variability. Plan for primary  section this morning at 0930. No change in patient progress.

## 2025-01-16 VITALS
DIASTOLIC BLOOD PRESSURE: 64 MMHG | RESPIRATION RATE: 18 BRPM | BODY MASS INDEX: 31.41 KG/M2 | HEART RATE: 93 BPM | TEMPERATURE: 98.1 F | SYSTOLIC BLOOD PRESSURE: 114 MMHG | HEIGHT: 60 IN | OXYGEN SATURATION: 96 % | WEIGHT: 160 LBS

## 2025-01-16 LAB — HGB BLD-MCNC: 8.4 G/DL (ref 11.7–15.7)

## 2025-01-16 PROCEDURE — 85018 HEMOGLOBIN: CPT | Performed by: OBSTETRICS & GYNECOLOGY

## 2025-01-16 PROCEDURE — 36415 COLL VENOUS BLD VENIPUNCTURE: CPT | Performed by: OBSTETRICS & GYNECOLOGY

## 2025-01-16 PROCEDURE — 250N000013 HC RX MED GY IP 250 OP 250 PS 637: Performed by: OBSTETRICS & GYNECOLOGY

## 2025-01-16 PROCEDURE — 120N000001 HC R&B MED SURG/OB

## 2025-01-16 RX ORDER — FERROUS SULFATE 325(65) MG
325 TABLET ORAL 2 TIMES DAILY
Status: DISCONTINUED | OUTPATIENT
Start: 2025-01-16 | End: 2025-01-18 | Stop reason: HOSPADM

## 2025-01-16 RX ADMIN — FERROUS SULFATE TAB 325 MG (65 MG ELEMENTAL FE) 325 MG: 325 (65 FE) TAB at 22:23

## 2025-01-16 RX ADMIN — SENNOSIDES AND DOCUSATE SODIUM 1 TABLET: 50; 8.6 TABLET ORAL at 08:39

## 2025-01-16 RX ADMIN — IBUPROFEN 800 MG: 800 TABLET ORAL at 10:03

## 2025-01-16 RX ADMIN — IBUPROFEN 800 MG: 800 TABLET ORAL at 04:19

## 2025-01-16 RX ADMIN — SIMETHICONE 80 MG: 80 TABLET, CHEWABLE ORAL at 07:04

## 2025-01-16 RX ADMIN — ACETAMINOPHEN 975 MG: 325 TABLET ORAL at 07:04

## 2025-01-16 RX ADMIN — ACETAMINOPHEN 975 MG: 325 TABLET ORAL at 12:58

## 2025-01-16 RX ADMIN — FERROUS SULFATE TAB 325 MG (65 MG ELEMENTAL FE) 325 MG: 325 (65 FE) TAB at 10:04

## 2025-01-16 RX ADMIN — IBUPROFEN 800 MG: 800 TABLET ORAL at 22:23

## 2025-01-16 RX ADMIN — OXYCODONE HYDROCHLORIDE 5 MG: 5 TABLET ORAL at 08:38

## 2025-01-16 RX ADMIN — ACETAMINOPHEN 975 MG: 325 TABLET ORAL at 19:04

## 2025-01-16 RX ADMIN — IBUPROFEN 800 MG: 800 TABLET ORAL at 16:08

## 2025-01-16 RX ADMIN — ACETAMINOPHEN 975 MG: 325 TABLET ORAL at 01:42

## 2025-01-16 ASSESSMENT — ACTIVITIES OF DAILY LIVING (ADL)
ADLS_ACUITY_SCORE: 34
ADLS_ACUITY_SCORE: 32
ADLS_ACUITY_SCORE: 34
ADLS_ACUITY_SCORE: 32
ADLS_ACUITY_SCORE: 34
ADLS_ACUITY_SCORE: 33
ADLS_ACUITY_SCORE: 33
ADLS_ACUITY_SCORE: 34
ADLS_ACUITY_SCORE: 32
ADLS_ACUITY_SCORE: 34
ADLS_ACUITY_SCORE: 33
ADLS_ACUITY_SCORE: 34
ADLS_ACUITY_SCORE: 33
ADLS_ACUITY_SCORE: 34
ADLS_ACUITY_SCORE: 33

## 2025-01-16 NOTE — PROGRESS NOTES
Obstetrics Post-Op Progress Note         Assessment and Plan:    Assessment: Dana Lund is a 24 year old  Post-operative day #1 s/p Low transverse primary  section doing well  PP hemorrhage has resolved  Acute blood loss anemia.    Plan:   Ambulation encouraged  Breast feeding strategies discussed  Anticipate discharge tomorrow  Oral iron           Interval History:   Doing well.  Pain is well-controlled.  No fevers.  No history of wound drainage, warmth or significant erythema.  Good appetite.  Denies chest pain, shortness of breath, nausea or vomiting.  Ambulatory.  Breastfeeding well.             Physical Exam:   Temp: 98.3  F (36.8  C) Temp src: Oral BP: 114/62 Pulse: 96   Resp: 17 SpO2: 98 % O2 Device: None (Room air)    Vitals:    01/15/25 05   Weight: 72.6 kg (160 lb)     Vital Signs with Ranges  Temp:  [97.8  F (36.6  C)-98.9  F (37.2  C)] 98.3  F (36.8  C)  Pulse:  [83-96] 96  Resp:  [14-21] 17  BP: (108-146)/(62-96) 114/62  SpO2:  [91 %-100 %] 98 %  I/O last 3 completed shifts:  In: 800 [P.O.:100; I.V.:700]  Out: 3575 [Urine:2350; Blood:1225]    Uterine fundus is firm, non-tender and at the level of the umbilicus  Incision C/D/I  Extremities Non-tender          Data:     Recent Results (from the past 24 hours)   Hemoglobin    Collection Time: 01/15/25  2:23 PM   Result Value Ref Range    Hemoglobin 9.7 (L) 11.7 - 15.7 g/dL   Platelet count    Collection Time: 01/15/25  2:23 PM   Result Value Ref Range    Platelet Count 217 150 - 450 10e3/uL   INR    Collection Time: 01/15/25  2:23 PM   Result Value Ref Range    INR 0.99 0.85 - 1.15   Partial thromboplastin time    Collection Time: 01/15/25  2:23 PM   Result Value Ref Range    aPTT 26 22 - 38 Seconds   Hemoglobin    Collection Time: 25  7:05 AM   Result Value Ref Range    Hemoglobin 8.4 (L) 11.7 - 15.7 g/dL     Recent Labs   Lab Test 01/15/25  0532   AS Negative     Recent Labs   Lab Test 25  0705 01/15/25  1423   HGB  8.4* 9.7*     Recent Labs   Lab Test 07/17/24  1500   RUQIGG Positive       Álvaro Montero MD

## 2025-01-16 NOTE — PLAN OF CARE
Goal Outcome Evaluation: improving.  Vital signs stable. Postpartum assessment WDL. Incision clean, dry, intact with dressing in place. Pain controlled with motrin and tylenol. Patient ambulating with minimal assist of one staff, easily fatigued and intermittent dizziness. Repeat Hgb ordered this am.Oral fluids encouraged and educated pt to call for assistance with ambulation.Aggarwal removed and voiding. Patient passing gas and has not had bowel movement. Breastfeeding on cue with minimal  assist. Patient and infant bonding well. Will continue with current plan of care.   Problem: Adult Inpatient Plan of Care  Goal: Optimal Comfort and Wellbeing  Outcome: Progressing     Problem: Breastfeeding  Goal: Effective Breastfeeding  Outcome: Progressing     Problem: Postpartum ( Delivery)  Goal: Optimal Pain Control and Function  Outcome: Progressing       Plan of Care Reviewed With: patient

## 2025-01-16 NOTE — LACTATION NOTE
This note was copied from a baby's chart.  Follow up Lactation Visit    Current age : 29hours old    Gestational age at delivery: 39 w 0 d     Diaper count: 3 wet 3 soiled black/green color     Feedings: 10 breast 3 supplement - maternal expressed breastmilk 1-2ml    Weight Loss: 5.7% at 24 hours    Breastfeeding goals:12+months, 2 years    Home Pump: Momcozy, Unimom opera    Breast assessment: Breast: Round, Symmetrical, and Soft , Nipple:Everted and Intact    Infant oral assessment: Tongue: Midline, Elevated, and Cupped, Suck: Strong and Coordinated    Feeding assessment: Attempted latch in cross cradle per maternal preference. Infant having difficulty achieving a deep latch and only suck on nipple. LC suggested football hold, but grandma expressing to mom not to try this position. Moved to sidelying position and with significant LC support, infant able to achieve a deep latch with about 1 minute of sucking and 2 swallows. Minimal sucks with breast compression and stimulation. LC expressed to mom and grandma infant not demonstrating milk transfer. Attempted on second side in sidelying with a latch and infant fell asleep after a few more minutes with non-nutritive sucking. Expressed recommendation to supplement with MEM, human donor milk or formula. Mom seemed open to this, but grandma was not and ultimately want to wait and see what mom pumps.     Bedside RN reports drops pumped and infant is asleep.     Feeding plan: HE and spoon feed to wake infant as needed. Attempt to latch infant is position of choice. If infant is not maintaining rhythmic sucking with swallows, mom should pump and feed back or utilize human donor milk or formula. Encouraged to start with 5ml via feeding method of choice.     Feed Expressed Milk to Baby Using Amounts Below as a Guideline:    0-24 hours is 2-10 ml per feeding   24-48 hours  is 5-15ml per feeding   48-72 hours is 15-30ml per feeding   72-96 hours is 30-60ml per feeding   96  hours and older follow healthcare providers recommendation      Give more as baby cues. If necessary, make up difference with donor milk or formula as a bridge until milk supply increases.       Education:   [x] Expected  feeding patterns in the first few days (pg. 38 of Your Guide to To Postpartum and Belt Care)/ the Second Night  [x] Stages of milk production  [x] Hand expression   [x] Feeding cues     [x] Benefits of skin to skin  [x] Breastfeeding positions  [x] Tips to get and maintain a deep latch  [] Usage of nipple shield  [x] Nutritive vs.non-nutritive sucking  [x] Gentle breast compressions as needed  [x] How to tell when baby is finished  [x] Supplementation  [] Paced bottle feeding  [] Spoon/cup feeding  [] LPI feeding patterns  [] LBW feeding patterns  [x] Expected  output  [x] Belt weight loss  [x] Overall goals/How to know baby is getting enough  [] Infant Feeding Log  [] Calming techniques  [] Engorgement/Reverse Pressure Softening  [x] Pumping  [] Breastpump education  [x] Inpatient breastfeeding support  [] Outpatient lactation resources    Follow up: Will follow up tomorrow.

## 2025-01-16 NOTE — PLAN OF CARE
Goal Outcome Evaluation:      Plan of Care Reviewed With: patient    Overall Patient Progress: improvingOverall Patient Progress: improving         Pt is taking scheduled and prn pain medications in order to stay on top of pain management. Pt is rating pain a 1 on the 0-10 pain scale. Pt is up ambulating in the room with assist x1.

## 2025-01-16 NOTE — LACTATION NOTE
This note was copied from a baby's chart.  Reason for Initial Lactation Visit: Lactation consultant to patient room to assess breastfeeding and offer support. Dana delivered her  via  this morning, her  is 10-hours old at the time of this visit. Dana is anxious regarding  being sleepy/disinterest at breast after several latch attempts.     Breastfeeding goals: Exclusive breastfeeding    Past breastfeeding experience: No (first baby)    Maternal health risks that may affect breastfeeding: PPH (QBL 1225), potential positioning challenges due to severe scoliosis     Pump for home use: ?     Breastfeeding since birth?: Yes (one good feeding recorded and some attempts)    Breast assessment: Round, soft, symmetrical breasts with everted nipples    Education: Instructed on benefit of skin to skin prior to feeding to waken and ready for feeding, importance of feeding baby on early hunger cues, and breastfeeding 8-12 times, both breasts, in 24 hours for adequate infant nutrition and hydration as well as for building an optimal milk supply. Education given on importance of optimal infant positioning for deep, comfortable latch and effective milk transfer. Instructed on techniques for keeping infant actively sucking, including breast compressions. Anticipatory guidance given on input/output goals, normal feeding volumes in the  period, and pumping.     Assessment/Intervention: Dana was assisted with hand expression, and  was spoon fed approximately 2 ml of colostrum. A latch attempt was made, however,  was too sleepy/disinterested.  was left skin-to-skin while consultant assisted Dana with more hand expression, and approximately 3 ml of colostrum was left at bedside. The plan of care for latching difficulty was provided and reviewed with Dana and her mother who is here helping at this time.     Care Plan - Latch Difficulty     Place baby skin to skin on mom's chest  up to an hour before feeding.   Attempt breastfeeding on infant's early hunger cues (hand in mouth, rooting).  Position baby in cross cradle or football hold, which may help achieve latch.   If latched, watch for rhythmic sucking and occasional swallows.  Limit latch attempts to 5-10 minutes.  If latch difficulty or few/no swallows noted:  Hand express colostrum and offer via spoon before or after feeding attempt to increase baby's energy level.  Pump breasts for 15 minutes to stimulate milk production.   Feed expressed milk to baby using the amounts below as a guideline, give more as baby cues.  If necessary, make up the difference with donor milk or formula as a bridge until milk supply increases:    0-24 hours     2-10 ml each feeding (may use spoon)  24-48 hours   5-15 ml each feeding  48-72 hours   15-30 ml each feeding  72-96 hours   30-60 ml each feeding       Other interventions/instructions: Answered questions, gave encouragement, and reassurance.  Dana was instructed to request for breastfeeding help, as needed, from lactation or nursing staff.     Lactation to follow up, tomorrow.

## 2025-01-17 PROCEDURE — 250N000013 HC RX MED GY IP 250 OP 250 PS 637: Performed by: OBSTETRICS & GYNECOLOGY

## 2025-01-17 PROCEDURE — 120N000001 HC R&B MED SURG/OB

## 2025-01-17 RX ORDER — LOPERAMIDE HYDROCHLORIDE 2 MG/1
2 CAPSULE ORAL 4 TIMES DAILY PRN
Status: DISCONTINUED | OUTPATIENT
Start: 2025-01-17 | End: 2025-01-18 | Stop reason: HOSPADM

## 2025-01-17 RX ADMIN — LOPERAMIDE HYDROCHLORIDE 2 MG: 2 CAPSULE ORAL at 00:47

## 2025-01-17 RX ADMIN — IBUPROFEN 800 MG: 800 TABLET ORAL at 04:33

## 2025-01-17 RX ADMIN — IBUPROFEN 800 MG: 800 TABLET ORAL at 17:51

## 2025-01-17 RX ADMIN — ACETAMINOPHEN 975 MG: 325 TABLET ORAL at 03:32

## 2025-01-17 RX ADMIN — LOPERAMIDE HYDROCHLORIDE 2 MG: 2 CAPSULE ORAL at 05:04

## 2025-01-17 RX ADMIN — IBUPROFEN 800 MG: 800 TABLET ORAL at 11:24

## 2025-01-17 RX ADMIN — ACETAMINOPHEN 975 MG: 325 TABLET ORAL at 15:08

## 2025-01-17 RX ADMIN — FERROUS SULFATE TAB 325 MG (65 MG ELEMENTAL FE) 325 MG: 325 (65 FE) TAB at 08:31

## 2025-01-17 RX ADMIN — ACETAMINOPHEN 975 MG: 325 TABLET ORAL at 20:39

## 2025-01-17 RX ADMIN — ACETAMINOPHEN 975 MG: 325 TABLET ORAL at 08:31

## 2025-01-17 RX ADMIN — FERROUS SULFATE TAB 325 MG (65 MG ELEMENTAL FE) 325 MG: 325 (65 FE) TAB at 20:39

## 2025-01-17 ASSESSMENT — ACTIVITIES OF DAILY LIVING (ADL)
ADLS_ACUITY_SCORE: 33
ADLS_ACUITY_SCORE: 32
ADLS_ACUITY_SCORE: 32
ADLS_ACUITY_SCORE: 34
ADLS_ACUITY_SCORE: 33
ADLS_ACUITY_SCORE: 32
ADLS_ACUITY_SCORE: 34
ADLS_ACUITY_SCORE: 32
ADLS_ACUITY_SCORE: 34
ADLS_ACUITY_SCORE: 32
ADLS_ACUITY_SCORE: 34

## 2025-01-17 NOTE — LACTATION NOTE
This note was copied from a baby's chart.  Follow up Lactation Visit    Current age : 46hours old    Gestational age at delivery: 39w0d     Diaper count: 2 wet 3 soiled      Feedings: 10 breast 0 supplement     Weight Loss: 9% at second weight check 6% 24 hour check     Breastfeeding goals:6-12 months    Past breastfeeding experience:first baby     Maternal health risk that may affect breastfeeding:None    Home Pump: Momcozy and Unimom opera     Breast assessment: Breast: Round and Symmetrical, Nipple:Everted    Infant oral assessment: Midline,     Feeding assessment:  walked into room with infant at breast, showing a rhythmic sucking pattern with swallows noted about every 5-8 sucks.      Reviewed WL % and infant feeding pattern.   Due to infant being sleepy at breast and WL- reviewed the need to pump and supplement until mom's breastmilk increases.  Reviewed pumping and bottle feeding techniques.  Mom pumped 2 ml, which was feed to infant along with 10 ml of formula.       Feeding plan:   Offer both breasts every 2-3 hours (8 times in 24 hours). If infant does not settle after breastfeeding, pump for 15 mins. Fed back all pumped milk and formula to offer infant 15-30 ml each feeding.        Education:   [] Expected  feeding patterns in the first few days (pg. 38 of Your Guide to To Postpartum and  Care)/ the Second Night  [] Stages of milk production  [] Hand expression   [] Feeding cues     [] Benefits of skin to skin  [] Breastfeeding positions  [] Tips to get and maintain a deep latch  [] Usage of nipple shield  [] Nutritive vs.non-nutritive sucking  [] Gentle breast compressions as needed  [] How to tell when baby is finished  [x] Supplementation  [x] Paced bottle feeding  [] Spoon/cup feeding  [] LPI feeding patterns  [] LBW feeding patterns  [] Expected  output  []  weight loss  [] Overall goals/How to know baby is getting enough  [] Infant Feeding Log  [] Calming techniques  []  Engorgement/Reverse Pressure Softening  [x] Pumping  [x] Breastpump education  [] Inpatient breastfeeding support  [] Outpatient lactation resources    Follow up: Will follow up Saturday or sooner if requested.

## 2025-01-17 NOTE — PROVIDER NOTIFICATION
01/17/25 0039   Provider Notification   Provider Name/Title Dr. Montero   Method of Notification Phone   Request Evaluate-Remote   Notification Reason Medication Request  (Request for immodium, patient has had diarrhea x3 since 1800.)     Dr. Montero notified of patient's loose bowel movements x3 since 1800.     Dr. Montero ordered imodium 4 times daily PRN.

## 2025-01-17 NOTE — PLAN OF CARE
Patient is bonding with baby boy, voiding without difficulty. Loose stools overnight, imodium given @ 0047.   VSS, assessment WNL. UTV incision, mepilex dressing CDI. Bleeding scant, firm 1 below U. Taking ibuprofen and tylenol as scheduled, tylenol delayed overnight due to request.   Breastfeeding encoruaged every 2-3 hours. Infant active at breast, minimal assistance with feeding needed. Attentive and responsive towards infant. Support offered during feedings. Patient's mom at bedside helping mom with cares.

## 2025-01-17 NOTE — PROGRESS NOTES
Postpartum Day 2:     Subjective:  The patient feels well. The patient has no emotional concerns. Pain is well controlled with current medications. The patient is ambulating well. She is having some diarrhea. The amount and color of the lochia is appropriate for the duration of recovery, patient denies clots. The baby is well.    Objective   The patient has a blood pressure which is within the normal range. Urinary output is adequate.     Exam:   /77 (BP Location: Right arm, Patient Position: Semi-Rios's, Cuff Size: Adult Regular)   Pulse 93   Temp 98.1  F (36.7  C) (Oral)   Resp 17   Ht 1.524 m (5')   Wt 72.6 kg (160 lb)   SpO2 97%   Breastfeeding Unknown   BMI 31.25 kg/m    General: NAD  Abdomen: soft, NT  Fundus: firm, nontender  Incision: Bandage on  Ext: no pain       Impression:   Normal postpartum course. POD#2, LTCS secondary to severe scoliosis, elective  Acute blood loss anemia, asymptomatic    Plan:   - Continue current care.  - Doing well  - Home tomorrow    Kaila Lyman M.D.

## 2025-01-17 NOTE — PLAN OF CARE
Goal Outcome Evaluation:      Plan of Care Reviewed With: patient    Overall Patient Progress: improvingOverall Patient Progress: improving    Outcome Evaluation: VSS, pain well controlled with ibuprofen and tylenol.  Incision covered with clean/dry/intact mepilex.  Fundus FML U/U, scant lochia.  Voiding and has had loose stools this evening, declined stool softener for tonight. States she is fatigued when up and moving around but has not had further dizziness.      Problem: Adult Inpatient Plan of Care  Goal: Absence of Hospital-Acquired Illness or Injury  Intervention: Prevent Skin Injury  Recent Flowsheet Documentation  Taken 1/16/2025 1600 by Vanna Mims RN  Body Position: position changed independently  Intervention: Prevent and Manage VTE (Venous Thromboembolism) Risk  Recent Flowsheet Documentation  Taken 1/16/2025 1600 by Vanna Mims RN  VTE Prevention/Management: SCDs off (sequential compression devices)  Intervention: Prevent Infection  Recent Flowsheet Documentation  Taken 1/16/2025 1600 by Vanna Mims RN  Infection Prevention:   environmental surveillance performed   equipment surfaces disinfected   hand hygiene promoted   personal protective equipment utilized   rest/sleep promoted     Problem: Adult Inpatient Plan of Care  Goal: Absence of Hospital-Acquired Illness or Injury  Intervention: Prevent Infection  Recent Flowsheet Documentation  Taken 1/16/2025 1600 by Vanna Mims RN  Infection Prevention:   environmental surveillance performed   equipment surfaces disinfected   hand hygiene promoted   personal protective equipment utilized   rest/sleep promoted

## 2025-01-17 NOTE — PLAN OF CARE
"  Problem: Adult Inpatient Plan of Care  Goal: Plan of Care Review  Description: The Plan of Care Review/Shift note should be completed every shift.  The Outcome Evaluation is a brief statement about your assessment that the patient is improving, declining, or no change.  This information will be displayed automatically on your shift  note.  Outcome: Progressing  Flowsheets (Taken 1/17/2025 1418)  Plan of Care Reviewed With: patient  Overall Patient Progress: improving  Goal: Patient-Specific Goal (Individualized)  Description: You can add care plan individualizations to a care plan. Examples of Individualization might be:  \"Parent requests to be called daily at 9am for status\", \"I have a hard time hearing out of my right ear\", or \"Do not touch me to wake me up as it startles  me\".  Outcome: Progressing  Goal: Absence of Hospital-Acquired Illness or Injury  Outcome: Progressing  Intervention: Prevent Skin Injury  Recent Flowsheet Documentation  Taken 1/17/2025 0840 by Syl Morocho RN  Body Position: position changed independently  Intervention: Prevent and Manage VTE (Venous Thromboembolism) Risk  Recent Flowsheet Documentation  Taken 1/17/2025 0840 by Syl Morocho RN  VTE Prevention/Management: SCDs off (sequential compression devices)  Intervention: Prevent Infection  Recent Flowsheet Documentation  Taken 1/17/2025 0840 by Syl Morocho RN  Infection Prevention:   environmental surveillance performed   equipment surfaces disinfected   hand hygiene promoted   personal protective equipment utilized   rest/sleep promoted  Goal: Optimal Comfort and Wellbeing  Outcome: Progressing  Intervention: Provide Person-Centered Care  Recent Flowsheet Documentation  Taken 1/17/2025 0840 by Syl Morocho RN  Trust Relationship/Rapport:   care explained   choices provided   emotional support provided   empathic listening provided   questions answered   questions encouraged   reassurance provided   " thoughts/feelings acknowledged  Goal: Readiness for Transition of Care  Outcome: Progressing   Goal Outcome Evaluation:      Plan of Care Reviewed With: patient    Overall Patient Progress: improvingOverall Patient Progress: improving     Dana is feeling well today, just very tired.  She got a good nap in when the baby was in the nursery for a circumcision.  She denies preeclampsia symptoms and her vitals have been stable.  Fundus is firm and lochia is light.  She is voiding spontaneously without difficulty.  She is no longer having loose stools.  She has been pumping and breastfeeding her baby.  Her mother and her spouse are at her bedside and attentive to her and Baby's needs.  Plan is for her to discharge home tomorrow.

## 2025-01-18 VITALS
RESPIRATION RATE: 16 BRPM | WEIGHT: 153.8 LBS | SYSTOLIC BLOOD PRESSURE: 129 MMHG | HEIGHT: 60 IN | HEART RATE: 105 BPM | TEMPERATURE: 98.5 F | DIASTOLIC BLOOD PRESSURE: 80 MMHG | BODY MASS INDEX: 30.19 KG/M2 | OXYGEN SATURATION: 98 %

## 2025-01-18 PROCEDURE — 250N000013 HC RX MED GY IP 250 OP 250 PS 637: Performed by: OBSTETRICS & GYNECOLOGY

## 2025-01-18 RX ORDER — OXYCODONE HYDROCHLORIDE 5 MG/1
5 TABLET ORAL EVERY 6 HOURS PRN
Qty: 12 TABLET | Refills: 0 | Status: SHIPPED | OUTPATIENT
Start: 2025-01-18 | End: 2025-01-21

## 2025-01-18 RX ADMIN — ACETAMINOPHEN 975 MG: 325 TABLET ORAL at 12:32

## 2025-01-18 RX ADMIN — IBUPROFEN 800 MG: 800 TABLET ORAL at 00:14

## 2025-01-18 RX ADMIN — FERROUS SULFATE TAB 325 MG (65 MG ELEMENTAL FE) 325 MG: 325 (65 FE) TAB at 08:09

## 2025-01-18 RX ADMIN — IBUPROFEN 800 MG: 800 TABLET ORAL at 05:23

## 2025-01-18 RX ADMIN — ACETAMINOPHEN 975 MG: 325 TABLET ORAL at 05:21

## 2025-01-18 RX ADMIN — IBUPROFEN 800 MG: 800 TABLET ORAL at 12:32

## 2025-01-18 ASSESSMENT — ACTIVITIES OF DAILY LIVING (ADL)
ADLS_ACUITY_SCORE: 34

## 2025-01-18 NOTE — PLAN OF CARE
"  Problem: Adult Inpatient Plan of Care  Goal: Plan of Care Review  Description: The Plan of Care Review/Shift note should be completed every shift.  The Outcome Evaluation is a brief statement about your assessment that the patient is improving, declining, or no change.  This information will be displayed automatically on your shift  note.  Outcome: Progressing  Flowsheets (Taken 1/18/2025 0045)  Plan of Care Reviewed With: patient  Overall Patient Progress: improving     Problem: Adult Inpatient Plan of Care  Goal: Patient-Specific Goal (Individualized)  Description: You can add care plan individualizations to a care plan. Examples of Individualization might be:  \"Parent requests to be called daily at 9am for status\", \"I have a hard time hearing out of my right ear\", or \"Do not touch me to wake me up as it startles  me\".  Outcome: Progressing     Problem: Adult Inpatient Plan of Care  Goal: Absence of Hospital-Acquired Illness or Injury  Outcome: Progressing  Intervention: Prevent Skin Injury  Recent Flowsheet Documentation  Taken 1/18/2025 0014 by Sumaya Beal RN  Body Position: position changed independently  Taken 1/18/2025 0000 by Sumaya Beal RN  Body Position: position changed independently  Intervention: Prevent and Manage VTE (Venous Thromboembolism) Risk  Recent Flowsheet Documentation  Taken 1/18/2025 0000 by Sumaya Beal RN  VTE Prevention/Management: (fluids and ambulation encouraged)   SCDs off (sequential compression devices)   other (see comments)     Problem: Adult Inpatient Plan of Care  Goal: Optimal Comfort and Wellbeing  Intervention: Monitor Pain and Promote Comfort  Recent Flowsheet Documentation  Taken 1/18/2025 0014 by Sumaya Beal RN  Pain Management Interventions:   medication (see MAR)   care clustered   rest     Problem: Breastfeeding  Goal: Effective Breastfeeding  Outcome: Progressing  Intervention: Promote Breast Care and Comfort  Recent Flowsheet " Documentation  Taken 2025 0000 by Sumaya Beal RN  Breast Care: Breastfeeding: breast milk to nipples  Breast Care: double electric breast pump utilized  Intervention: Promote Effective Breastfeeding  Recent Flowsheet Documentation  Taken 2025 0000 by Sumaya Beal RN  Breastfeeding Assistance:   support offered   supplemental feeding provided   infant suck/swallow verified   infant latch-on verified   hand expression verified   feeding session observed   feeding on demand promoted   feeding cue recognition promoted   electric breast pump used   assisted with positioning  Intervention: Support Exclusive Breastfeeding Success  Recent Flowsheet Documentation  Taken 2025 0000 by Sumaya Beal RN  Supportive Measures:   active listening utilized   decision-making supported   goal-setting facilitated   positive reinforcement provided   problem-solving facilitated   relaxation techniques promoted   self-care encouraged     Problem: Postpartum ( Delivery)  Goal: Successful Parent Role Transition  Outcome: Progressing  Intervention: Support Parent Role Transition  Recent Flowsheet Documentation  Taken 2025 0000 by Sumaya Beal RN  Supportive Measures:   active listening utilized   decision-making supported   goal-setting facilitated   positive reinforcement provided   problem-solving facilitated   relaxation techniques promoted   self-care encouraged   Goal Outcome Evaluation:      Plan of Care Reviewed With: patient    Overall Patient Progress: improvingOverall Patient Progress: improving      Dana's pain well managed with current meds, She rates pain 1/10. Writer demonstrated and educated mom on how to put breast pump together, how long to pump and how to use pump parts.vitals and post C/Section assessment are within normal limit.   Grand ma is rooming in assisting and supporting Mom and baby. Meeting discharge Goals Sumaya Beal RN

## 2025-01-18 NOTE — LACTATION NOTE
Follow up Lactation Visit    Current age : 2 day 23 hours  old    Gestational age at delivery: 39w0d     Diaper count: 5 wet 6 soiled green color     Feedings: 6 breast 7 supplement     Weight Loss: 6% at 3rd weight check 9% at second weight check     Breastfeeding goals:6-12 months    Past breastfeeding experience:first baby     Maternal health risk that may affect breastfeeding:None    Home Pump: momcozy and unimom     Breast assessment: Breast: Round, Symmetrical, and Filing , Nipple:Everted    Feeding assessment: Dana reports that latching has been going well, he still is sleepy at times during feeds, declined support with latch will request LC if needed.    Reviewed feeding and discharge plan in detail.         Feeding plan:   arBreastfeeding Plan:     Offer breast every 2-3 hours or sooner if baby is showing hunger cues  Massage breast to encourage milk flow   Strive for a deep and comfortable latch (Should feel like a tugging at the nipple)   Positioning reminders:  Line up baby's nose to nipple   Baby's chin should be tilted into the breast tissue and baby's nose gentle touching the breast   Ear, shoulder, hip, nice straight line   Chin of baby should not be resting on the baby's chest.   Your thumb lined up like baby's mustache, fingers under breast like a baby's beard  Baby's cheeks should be touching breast equally on both sides   Switch sides when swallows slow, baby pauses lengthen and  breast compressions do not increase the swallows or activity at the breast     Overall goals for baby:    Feed well at breast 8 or more times per day, 15 minutes of active swallowing over 20-40 minutes at breast  Lose no more than 8-10% of birthweight in the first 3 days  Meet goals for wet and soiled diapers (per Postpartum &  Care booklet)  Weight back to birthweight in 10-14 days    If Above Goals Are Not Met     Pump 15-20 minutes after breastfeeding to stimulate and collect breast milk.  Offer back pumped  milk after every feeding until infant is meeting goals above   Call and make appointment with a lactation consultant or baby's doctor     Feed Expressed Milk to Baby Using Amounts Below as a Guideline:    0-24 hours is 2-10 ml per feeding   24-48 hours  is 5-15ml per feeding   48-72 hours is 15-30ml per feeding   72-96 hours is 30-60ml per feeding   96 hours and older follow healthcare providers recommendation    Give more as baby cues. If necessary, make up difference with donor milk or formula as a bridge until milk supply increases.         Education:   [x] Expected  feeding patterns in the first few days (pg. 38 of Your Guide to To Postpartum and  Care)/ the Second Night  [x] Stages of milk production  [] Hand expression   [] Feeding cues     [] Benefits of skin to skin  [] Breastfeeding positions  [] Tips to get and maintain a deep latch  [] Usage of nipple shield  [] Nutritive vs.non-nutritive sucking  [x] Gentle breast compressions as needed  [] How to tell when baby is finished  [x] Supplementation  [] Paced bottle feeding  [] Spoon/cup feeding  [] LPI feeding patterns  [] LBW feeding patterns  [] Expected  output  [] [place] weight loss  [] Overall goals/How to know baby is getting enough  [] Infant Feeding Log  [] Calming techniques  [x] Engorgement/Reverse Pressure Softening  [x] Pumping  [x] Breastpump education  [] Inpatient breastfeeding support  [x] Outpatient lactation resources    Follow up: Encouraged follow up with LC next week.

## 2025-01-18 NOTE — PLAN OF CARE
Goal Outcome Evaluation: Progressing      Plan of Care Reviewed With: patient, spouse, parent    Overall Patient Progress: improvingOverall Patient Progress: improving    Patient is ambulating in room independently. Her mother is in the room and has been assisting patient in the bathroom. The patient's spouse is in the room as well, and has been attentive to her and infant.     Patient denies pain. Scheduled Ibuprofen and Tylenol given per MAR. Heat packs provided per patient request.     Incision is covered with a mepilex dressing and is clean, dry, and intact. Incision care instructions reviewed with patient.     Fundus is firm, bleeding scant. VSS.    /63 (BP Location: Left arm)   Pulse 102   Temp 98.3  F (36.8  C) (Oral)   Resp 16   Ht 1.524 m (5')   Wt 72.6 kg (160 lb)   SpO2 100%   Breastfeeding Unknown   BMI 31.25 kg/m      Margarita Sheppard RN on 1/17/2025 at 9:09 PM

## 2025-01-18 NOTE — PLAN OF CARE
Goal Outcome Evaluation:     Plan of Care Reviewed With: patient    Overall Patient Progress: improvingOverall Patient Progress: improving    Outcome Evaluation: Improving towards discharge    Pain managed with scheduled Tylenol and Ibuprofen.    Up independently. Voiding. Tolerating regular foods.    Postpartum assessment WNL. Dana's mother is at the bedside this morning and is supportive.    Breastfeeding and pumping.     Hgb 8.4. Po Fe.    Home today. Completed birth certificate and mood assessment on prior shift.

## 2025-01-18 NOTE — PLAN OF CARE
"  Problem: Adult Inpatient Plan of Care  Goal: Plan of Care Review  Description: The Plan of Care Review/Shift note should be completed every shift.  The Outcome Evaluation is a brief statement about your assessment that the patient is improving, declining, or no change.  This information will be displayed automatically on your shift  note.  Outcome: Progressing  Flowsheets (Taken 1/18/2025 0657)  Outcome Evaluation: stable post partum course. breastfeeding well.  Plan of Care Reviewed With: patient  Overall Patient Progress: improving  Goal: Patient-Specific Goal (Individualized)  Description: You can add care plan individualizations to a care plan. Examples of Individualization might be:  \"Parent requests to be called daily at 9am for status\", \"I have a hard time hearing out of my right ear\", or \"Do not touch me to wake me up as it startles  me\".  Outcome: Progressing  Goal: Absence of Hospital-Acquired Illness or Injury  Outcome: Progressing  Goal: Optimal Comfort and Wellbeing  Outcome: Progressing  Goal: Readiness for Transition of Care  Outcome: Progressing   Goal Outcome Evaluation:      Plan of Care Reviewed With: patient    Overall Patient Progress: improvingOverall Patient Progress: improving    Outcome Evaluation: stable post partum course. breastfeeding well.      "

## 2025-01-18 NOTE — DISCHARGE SUMMARY
HOSPITAL DISCHARGE SUMMARY -  Birth    Patient Name: Dana Lund   YOB: 2000  Age: 24 year old  Medical Record Number: [id number]  Primary Physician: Davi Lua    Admission Date:  1/15/2025  Delivery Date:   1  Gestational Age at Delivery:  39w0d   Discharge Date:  2025    REASON FOR ADMISSION: Labor and Delivery    DIAGNOSIS:    1.  Birth secondary to  CPD  2. APGARS at 1 min 8, at 5 min 9  3. Post op blood loss anemia      Conditions complicating antepartum/postpartum:  Other Complications/Significant Findings:  Scoliosis    PROCEDURES:  Lower transverse     SIGNIFICANT DIAGNOSTIC PROCEDURES:   None    CONSULTS: None    HISTORY OF PRESENT ILLNESS AND HOSPITAL COURSE: This is a 24 year old   female who underwent  section without complication secondary to  CPD  . Postoperative course was unremarkable. On the day of discharge patient was tolerating diet, pain was controlled with oral medications, she was voiding and passing gas.    LABS:  Lab Results   Component Value Date    HGB 8.4 (L) 2025     Pre-operative hgb : 12      PENDING LABS:  None    DISPOSITION:  Home    DISCHARGE CONDITION: Good/Stable    DISCHARGE MEDICATIONS:      Review of your medicines        UNREVIEWED medicines. Ask your doctor about these medicines        Dose / Directions   prenatal multivitamin  plus iron 27-1 MG Tabs      Dose: 1 tablet  Take 1 tablet by mouth daily.  Refills: 0              DISCHARGE PLAN:   - Follow up with  Dr Sarmiento, in 1 week  - Take medication as prescribed  - Physical activity: As tolerated, no heavy lifting. Pelvic rest.  - Diet:  Regular  - Medication:  Please see MAR  - Warning signs discussed with patient about when to call the clinic/hospital  - All questions and concerns were answered for the patient prior to discharge.         Selwyn Enriquez MD on 2025 at 9:02 AM      I saw the patient on the date of discharge  Total  time spent for discharge on date of discharge: 10 minutes    Physician(s) in addition to primary physician who should receive a copy:  CC1: Selwyn Enriquez MD

## 2025-01-18 NOTE — LACTATION NOTE
This note was copied from a baby's chart.  arBreastfeeding Plan:     Offer breast every 2-3 hours or sooner if baby is showing hunger cues  Massage breast to encourage milk flow   Strive for a deep and comfortable latch (Should feel like a tugging at the nipple)   Positioning reminders:  Line up baby's nose to nipple   Baby's chin should be tilted into the breast tissue and baby's nose gentle touching the breast   Ear, shoulder, hip, nice straight line   Chin of baby should not be resting on the baby's chest.   Your thumb lined up like baby's mustache, fingers under breast like a baby's beard  Baby's cheeks should be touching breast equally on both sides   Switch sides when swallows slow, baby pauses lengthen and  breast compressions do not increase the swallows or activity at the breast     Overall goals for baby:    Feed well at breast 8 or more times per day, 15 minutes of active swallowing over 20-40 minutes at breast  Lose no more than 8-10% of birthweight in the first 3 days  Meet goals for wet and soiled diapers (per Postpartum & [place] Care booklet)  Weight back to birthweight in 10-14 days    If Above Goals Are Not Met     Pump 15-20 minutes after breastfeeding to stimulate and collect breast milk.  Offer back pumped milk after every feeding until infant is meeting goals above   Call and make appointment with a lactation consultant or baby's doctor     Feed Expressed Milk to Baby Using Amounts Below as a Guideline:    0-24 hours is 2-10 ml per feeding   24-48 hours  is 5-15ml per feeding   48-72 hours is 15-30ml per feeding   72-96 hours is 30-60ml per feeding   96 hours and older follow healthcare providers recommendation    Give more as baby cues. If necessary, make up difference with donor milk or formula as a bridge until milk supply increases.     Engorgement     Before Breastfeeding or Pumping:    Soften breast tissue by applying a warm damp compress, shower, bathtub and/or dangle breasts in bowl of  warm water for 2-5 minutes before breastfeeding.   Soften areola using reverse pressure softening. (See illustration below.)  Place your fingers on either side of the nipple.   Push gently but firmly straight inward toward your ribs.   Hold the pressure steady for 30-60 seconds.    Repeat with your fingers above and below the nipple.    Goal is for your areola to be soft like room temperature butter.                     Drawing by Prashanth Ambrocio    After Breastfeeding:    Ice packs on breasts for up to 20 minutes as needed.  Talk to your healthcare provider about anti-inflammatory medication.  If still painfully full, pump or hand express then stop pumping when breasts are softer and more comfortable.      Assessment of Breastfeeding after discharge: Is baby getting enough to eat?    If you answer YES to all these questions by day 5, you will know breastfeeding is going well.    If you answer NO to any of these questions, call your baby's medical provider or Outpatient Lactation at [phone].  Refer to the Postpartum and [place] Care Book(PNC), starting on page 35. (This is the booklet you tracked baby's feedings and diaper counts while in the hospital.)   Please call Outpatient Lactation at [phone] with breastfeeding questions or concerns.    1.  My milk came in (breasts became cleary on day 3-5 after birth).  I am softening the areola using hand expression or reverse pressure softening prior to latch, as needed.  YES NO   2.  My baby breastfeeds at least 8 times in 24 hours. YES NO   3.  My baby usually gives feeding cues (answer  No  if your baby is sleepy and you need to wake baby for most feedings).  *PNC page 36   YES NO   4.  My baby latches on my breast easily.  *PNC page 37  YES NO   5.  During breastfeeding, I hear my baby frequently swallowing, (one-two sucks per swallow).  YES NO   6.  I allow my baby to drain the first breast before I offer the other side.   YES NO   7.  My baby is satisfied  "after breastfeeding.   *PNC page 39 YES NO   8.  My breasts feel cleary before feedings and softer after feedings. YES NO   9.  My breasts and nipples are comfortable.  I have no engorgement or cracked nipples.    *PNC Page 40 and 41  YES NO   10.  My baby is meeting the wet diaper goals each day.  *PNC page 38  YES NO   11.  My baby is meeting the soiled diaper goals each day. *PNC page 38 YES NO   12.  My baby is only getting my breast milk, no formula. YES NO   13. I know my baby needs to be back to birth weight by day 14.  YES NO   14. I know my baby will cluster feed and have growth spurts. *PNC page 39  YES NO   15.  I feel confident in breastfeeding.  If not, I know where to get support. YES NO     Other resources:  www.[employer]  www.[employer].ca-Breastfeeding Videos  www.[employer].org--Our videos-Breastfeeding  YouTube short video \"Roby Hold/ Asymmetric Latch \" Breastfeeding Education by [employer]LE[employer].       "

## 2025-01-19 ENCOUNTER — HEALTH MAINTENANCE LETTER (OUTPATIENT)
Age: 25
End: 2025-01-19

## (undated) DEVICE — BLADE CLIPPER DISP 4406

## (undated) DEVICE — PACK MINOR SINGLE BASIN SSK3001

## (undated) DEVICE — SOL NACL 0.9% IRRIG 1000ML BOTTLE 2F7124

## (undated) DEVICE — SOL WATER IRRIG 1000ML BOTTLE 2F7114

## (undated) DEVICE — GLOVE SURG PI ULTRA TOUCH M SZ 7 LF 42670

## (undated) DEVICE — CUSTOM PACK C-SECTION LHE

## (undated) DEVICE — SUTURE VICRYL+ 0 36IN CT-1 UND VCP946H

## (undated) DEVICE — SUCTION TIP YANKAUER W/O VENT K86

## (undated) DEVICE — ESU GROUND PAD ADULT REM W/15' CORD E7507DB

## (undated) DEVICE — DRSG PRIMAPORE 04X11 3/4"

## (undated) DEVICE — PREP CHLORAPREP 26ML TINTED HI-LITE ORANGE 930815

## (undated) DEVICE — SUCTION MANIFOLD NEPTUNE 2 SYS 1 PORT 702-025-000

## (undated) RX ORDER — PROPOFOL 10 MG/ML
INJECTION, EMULSION INTRAVENOUS
Status: DISPENSED
Start: 2025-01-15

## (undated) RX ORDER — METHYLERGONOVINE MALEATE 0.2 MG/ML
INJECTION INTRAVENOUS
Status: DISPENSED
Start: 2025-01-15

## (undated) RX ORDER — LIDOCAINE HYDROCHLORIDE 10 MG/ML
INJECTION, SOLUTION EPIDURAL; INFILTRATION; INTRACAUDAL; PERINEURAL
Status: DISPENSED
Start: 2025-01-15

## (undated) RX ORDER — ONDANSETRON 2 MG/ML
INJECTION INTRAMUSCULAR; INTRAVENOUS
Status: DISPENSED
Start: 2025-01-15

## (undated) RX ORDER — DEXAMETHASONE SODIUM PHOSPHATE 10 MG/ML
INJECTION, SOLUTION INTRAMUSCULAR; INTRAVENOUS
Status: DISPENSED
Start: 2025-01-15

## (undated) RX ORDER — KETOROLAC TROMETHAMINE 30 MG/ML
INJECTION, SOLUTION INTRAMUSCULAR; INTRAVENOUS
Status: DISPENSED
Start: 2025-01-15

## (undated) RX ORDER — TRANEXAMIC ACID 100 MG/ML
INJECTION, SOLUTION INTRAVENOUS
Status: DISPENSED
Start: 2025-01-15